# Patient Record
Sex: FEMALE | Race: WHITE | NOT HISPANIC OR LATINO | Employment: OTHER | URBAN - METROPOLITAN AREA
[De-identification: names, ages, dates, MRNs, and addresses within clinical notes are randomized per-mention and may not be internally consistent; named-entity substitution may affect disease eponyms.]

---

## 2019-11-18 ENCOUNTER — APPOINTMENT (OUTPATIENT)
Dept: WOMENS IMAGING | Facility: HOSPITAL | Age: 59
End: 2019-11-18

## 2019-11-18 PROCEDURE — 77067 SCR MAMMO BI INCL CAD: CPT | Performed by: RADIOLOGY

## 2022-04-21 ENCOUNTER — TELEPHONE (OUTPATIENT)
Dept: FAMILY MEDICINE CLINIC | Facility: CLINIC | Age: 62
End: 2022-04-21

## 2022-04-21 NOTE — TELEPHONE ENCOUNTER
SCHEDULED FOR HFU ON 4/27/22 WITH DR. SANTIAGO, SEEN AT [facility] AND [place] ON 4/19 FOR CHEST PAINS

## 2022-04-27 ENCOUNTER — OFFICE VISIT (OUTPATIENT)
Dept: FAMILY MEDICINE CLINIC | Facility: CLINIC | Age: 62
End: 2022-04-27

## 2022-04-27 VITALS — WEIGHT: 251 LBS | SYSTOLIC BLOOD PRESSURE: 138 MMHG | DIASTOLIC BLOOD PRESSURE: 82 MMHG

## 2022-04-27 DIAGNOSIS — R07.9 CHEST PAIN, UNSPECIFIED TYPE: Primary | ICD-10-CM

## 2022-04-27 DIAGNOSIS — R06.02 SHORTNESS OF BREATH: ICD-10-CM

## 2022-04-27 PROCEDURE — 99214 OFFICE O/P EST MOD 30 MIN: CPT | Performed by: STUDENT IN AN ORGANIZED HEALTH CARE EDUCATION/TRAINING PROGRAM

## 2022-04-27 RX ORDER — LOSARTAN POTASSIUM 100 MG/1
TABLET ORAL
COMMUNITY
Start: 2022-04-26 | End: 2022-06-21

## 2022-04-27 RX ORDER — MELOXICAM 15 MG/1
15 TABLET ORAL DAILY
COMMUNITY
Start: 2022-02-27 | End: 2022-11-17

## 2022-04-27 RX ORDER — PROMETHAZINE HYDROCHLORIDE 25 MG/1
TABLET ORAL
COMMUNITY
Start: 2022-03-02

## 2022-04-27 RX ORDER — BUMETANIDE 0.5 MG/1
0.5 TABLET ORAL DAILY PRN
COMMUNITY
Start: 2022-04-18 | End: 2022-05-10 | Stop reason: SDUPTHER

## 2022-04-27 RX ORDER — METHOCARBAMOL 750 MG/1
750 TABLET, FILM COATED ORAL 3 TIMES DAILY PRN
COMMUNITY
Start: 2022-02-01 | End: 2022-11-17

## 2022-04-27 RX ORDER — ALBUTEROL SULFATE 90 UG/1
AEROSOL, METERED RESPIRATORY (INHALATION)
COMMUNITY
Start: 2022-02-24

## 2022-04-27 RX ORDER — MECLIZINE HYDROCHLORIDE 25 MG/1
25 TABLET ORAL 3 TIMES DAILY PRN
COMMUNITY
Start: 2022-03-18 | End: 2023-01-04 | Stop reason: SDUPTHER

## 2022-04-27 NOTE — ASSESSMENT & PLAN NOTE
Echo limited that was ordered by Dr. Lainez, CT unable to be retrieved today to review.  We will continue to follow

## 2022-04-27 NOTE — ASSESSMENT & PLAN NOTE
Has appropriate follow-up scheduled with cardiology.  She is asymptomatic at this time advised that she should continue to take her current losartan and Bumex and call with any new or worsening symptoms.  She should be checking her blood pressure at least 3 times a week to make sure that her blood pressure is remaining stable as she has had some outpatient elevated blood pressures and her blood pressure was very elevated in the emergency department.

## 2022-04-27 NOTE — PROGRESS NOTES
Chief Complaint  Hospital Follow Up Visit (Had Chest pain. Going to See Dr Olea for cardio 5/9)    Broderick Stallings presents to [facility]  History of Present Illness    Patient is here for hospital follow up. She went to the ED on 4/19/22 and She was evaluated in the ED. She state that she has been doing better since she was discharged. She states that she was given Bumex Iv and had improvement in her SOA. She states that she has seen Dr. Lainez since her last appointment here and was told that he does not think that her lungs are causing her underlying SOA, and he as concerned about her heart. She had ECHO and CT scan.     Objective   Vital Signs:   /82 (BP Location: Left arm, Patient Position: Sitting, Cuff Size: Large Adult)   Wt 114 kg (251 lb)     There is no height or weight on file to calculate BMI.    Review of Systems    Past History:  Medical History: has a past medical history of Bell's palsy (2006), Chondromalacia, Environmental allergies, and Tonsillitis.   Surgical History: has a past surgical history that includes Tonsillectomy; Cholecystectomy; and Knee surgery.   Family History: family history is not on file.   Social History: reports that she has never smoked. She has never used smokeless tobacco. Alcohol use questions deferred to the physician. Drug use questions deferred to the physician.      Current Outpatient Medications:   •  bumetanide (BUMEX) 0.5 MG tablet, Take 0.5 mg by mouth Daily As Needed., Disp: , Rfl:   •  losartan (COZAAR) 100 MG tablet, , Disp: , Rfl:   •  meclizine (ANTIVERT) 25 MG tablet, Take 25 mg by mouth 3 (Three) Times a Day As Needed., Disp: , Rfl:   •  meloxicam (MOBIC) 15 MG tablet, Take 15 mg by mouth Daily., Disp: , Rfl:   •  methocarbamol (ROBAXIN) 750 MG tablet, Take 750 mg by mouth 3 (Three) Times a Day As Needed. for muscle spams, Disp: , Rfl:   •  promethazine (PHENERGAN) 25 MG tablet, TAKE 1 TABLET BY  MOUTH EVERY 4 TO 6 HOURS AS NEEDED FOR NAUSEA, Disp: , Rfl:   •  albuterol sulfate  (90 Base) MCG/ACT inhaler, INHALE 2 PUFFS BY MOUTH EVERY 4 TO 6 HOURS AS NEEDED FOR SHORTNESS OF BREATH, Disp: , Rfl:     Allergies: Cephalosporins, Codeine, Eggs or egg-derived products, Hydrocodone, Penicillin g pot [penicillin g], and Ranitidine    Physical Exam  Constitutional:       General: She is not in acute distress.     Appearance: Normal appearance. She is not ill-appearing or toxic-appearing.   HENT:      Head: Normocephalic and atraumatic.   Cardiovascular:      Rate and Rhythm: Normal rate and regular rhythm.      Heart sounds: No murmur heard.    No gallop.   Pulmonary:      Effort: Pulmonary effort is normal.      Breath sounds: Normal breath sounds.   Abdominal:      General: Abdomen is flat.      Palpations: Abdomen is soft.      Tenderness: There is no abdominal tenderness. There is no guarding.   Musculoskeletal:      Right lower leg: Edema (+2 pitting to mid shin) present.      Left lower leg: Edema ( +2 pitting to mid shin) present.   Neurological:      General: No focal deficit present.      Mental Status: She is alert and oriented to person, place, and time.   Psychiatric:         Mood and Affect: Mood normal.         Thought Content: Thought content normal.          Result Review :                   Assessment and Plan    Diagnoses and all orders for this visit:    1. Chest pain, unspecified type (Primary)  Assessment & Plan:  Has appropriate follow-up scheduled with cardiology.  She is asymptomatic at this time advised that she should continue to take her current losartan and Bumex and call with any new or worsening symptoms.  She should be checking her blood pressure at least 3 times a week to make sure that her blood pressure is remaining stable as she has had some outpatient elevated blood pressures and her blood pressure was very elevated in the emergency department.      2. Shortness of  breath  Assessment & Plan:  Echo limited that was ordered by Dr. Lainez, CT unable to be retrieved today to review.  We will continue to follow        Follow Up   Return in about 3 months (around 7/27/2022).  Patient was given instructions and counseling regarding her condition or for health maintenance advice. Please see specific information pulled into the AVS if appropriate.     Virginia Villegas, DO

## 2022-05-11 RX ORDER — BUMETANIDE 0.5 MG/1
0.5 TABLET ORAL DAILY PRN
Qty: 15 TABLET | Refills: 1 | Status: SHIPPED | OUTPATIENT
Start: 2022-05-11 | End: 2022-06-23 | Stop reason: SDUPTHER

## 2022-06-17 ENCOUNTER — TELEPHONE (OUTPATIENT)
Dept: FAMILY MEDICINE CLINIC | Facility: CLINIC | Age: 62
End: 2022-06-17

## 2022-06-17 NOTE — TELEPHONE ENCOUNTER
Patient and sister called.  Two days ago patient fell, bruised her left leg, there are now blisters.  Patient was given earliest available appt with Makenna Jackson, 6/23/22.  They were concerned and didn't know if it she should be sooner. Ph: [phone].

## 2022-06-18 ENCOUNTER — TELEPHONE (OUTPATIENT)
Dept: FAMILY MEDICINE CLINIC | Facility: CLINIC | Age: 62
End: 2022-06-18

## 2022-06-18 ENCOUNTER — OFFICE VISIT (OUTPATIENT)
Dept: FAMILY MEDICINE CLINIC | Facility: CLINIC | Age: 62
End: 2022-06-18

## 2022-06-18 VITALS
HEIGHT: 66 IN | HEART RATE: 65 BPM | WEIGHT: 255 LBS | SYSTOLIC BLOOD PRESSURE: 138 MMHG | OXYGEN SATURATION: 97 % | BODY MASS INDEX: 40.98 KG/M2 | DIASTOLIC BLOOD PRESSURE: 80 MMHG

## 2022-06-18 DIAGNOSIS — M79.605 PAIN OF LEFT LOWER EXTREMITY: Primary | ICD-10-CM

## 2022-06-18 DIAGNOSIS — N28.89 RENAL MASS: ICD-10-CM

## 2022-06-18 PROBLEM — I10 HYPERTENSIVE DISORDER: Status: ACTIVE | Noted: 2022-06-15

## 2022-06-18 PROBLEM — E55.9 VITAMIN D DEFICIENCY: Status: ACTIVE | Noted: 2022-06-15

## 2022-06-18 PROBLEM — E78.5 HYPERLIPIDEMIA: Status: ACTIVE | Noted: 2022-06-15

## 2022-06-18 PROCEDURE — 99214 OFFICE O/P EST MOD 30 MIN: CPT | Performed by: NURSE PRACTITIONER

## 2022-06-18 RX ORDER — NAPROXEN 500 MG/1
TABLET ORAL EVERY 12 HOURS SCHEDULED
COMMUNITY
End: 2022-11-17

## 2022-06-18 NOTE — PROGRESS NOTES
"Chief Complaint  Leg Pain (Left leg)    Subjective          Kandy Stallings presents to [facility]  Pt fell 4 days ago and was dx with left wrist fracture. She has had swelling, rash, redness, and bruising in her left lower since yesterday. She went to the [facility] ER 3 weeks ago for a kidney stone and was dx with a mass on the right kidney. They recommended an MRI for follow up.      Objective   Vital Signs:   /80   Pulse 65   Ht 167.6 cm (66\")   Wt 116 kg (255 lb)   SpO2 97%   BMI 41.16 kg/m²     Body mass index is 41.16 kg/m².    Review of Systems   Constitutional: Negative for fatigue and fever.   Respiratory: Negative for shortness of breath.    Cardiovascular: Negative for chest pain, palpitations and leg swelling.   Musculoskeletal: Positive for arthralgias.   Skin: Positive for rash.   Neurological: Negative for syncope.   Psychiatric/Behavioral: The patient is not nervous/anxious.           Current Outpatient Medications:   •  bumetanide (BUMEX) 0.5 MG tablet, Take 1 tablet by mouth Daily As Needed (swelling)., Disp: 15 tablet, Rfl: 1  •  losartan (COZAAR) 100 MG tablet, , Disp: , Rfl:   •  meclizine (ANTIVERT) 25 MG tablet, Take 25 mg by mouth 3 (Three) Times a Day As Needed., Disp: , Rfl:   •  meloxicam (MOBIC) 15 MG tablet, Take 15 mg by mouth Daily., Disp: , Rfl:   •  methocarbamol (ROBAXIN) 750 MG tablet, Take 750 mg by mouth 3 (Three) Times a Day As Needed. for muscle spams, Disp: , Rfl:   •  naproxen (NAPROSYN) 500 MG tablet, Every 12 (Twelve) Hours., Disp: , Rfl:   •  promethazine (PHENERGAN) 25 MG tablet, TAKE 1 TABLET BY MOUTH EVERY 4 TO 6 HOURS AS NEEDED FOR NAUSEA, Disp: , Rfl:   •  albuterol sulfate  (90 Base) MCG/ACT inhaler, INHALE 2 PUFFS BY MOUTH EVERY 4 TO 6 HOURS AS NEEDED FOR SHORTNESS OF BREATH, Disp: , Rfl:       Allergies: Cephalosporins, Codeine, Hydrocodone, Penicillin g pot [penicillin g], and Ranitidine    Physical " Exam  Constitutional:       Appearance: Normal appearance.   HENT:      Head: Normocephalic.   Eyes:      Conjunctiva/sclera: Conjunctivae normal.      Pupils: Pupils are equal, round, and reactive to light.   Cardiovascular:      Rate and Rhythm: Normal rate and regular rhythm.      Heart sounds: Normal heart sounds.   Pulmonary:      Effort: Pulmonary effort is normal.      Breath sounds: Normal breath sounds.   Abdominal:      Tenderness: There is no abdominal tenderness.   Musculoskeletal:         General: Normal range of motion.      Comments: Ecchymosis, edema, mild erythema, blistering left lower leg.  Abrasion left knee.    Skin:     General: Skin is warm and dry.      Capillary Refill: Capillary refill takes less than 2 seconds.   Neurological:      General: No focal deficit present.      Mental Status: She is alert and oriented to person, place, and time.   Psychiatric:         Mood and Affect: Mood normal.         Behavior: Behavior normal.         Thought Content: Thought content normal.         Judgment: Judgment normal.          Result Review :                   Assessment and Plan    Diagnoses and all orders for this visit:    1. Pain of left lower extremity (Primary)  Comments:  I advised ER evaluation and treatment to rule out DVT and acute fracture. She will follow up after DC.    2. Renal mass  Comments:  I will get records from [facility] and we will order MRI if needed.                  Follow Up   Return in about 1 week (around 6/25/2022) for if not improving or sooner if symptoms worsen.  Patient was given instructions and counseling regarding her condition or for health maintenance advice. Please see specific information pulled into the AVS if appropriate.     SANDHYA Starr

## 2022-06-18 NOTE — TELEPHONE ENCOUNTER
Will you please get records from [facility] approx 3 weeks ago? Thanks!

## 2022-06-21 ENCOUNTER — TELEPHONE (OUTPATIENT)
Dept: FAMILY MEDICINE CLINIC | Facility: CLINIC | Age: 62
End: 2022-06-21

## 2022-06-21 RX ORDER — LOSARTAN POTASSIUM 100 MG/1
TABLET ORAL
Qty: 30 TABLET | Refills: 0 | Status: SHIPPED | OUTPATIENT
Start: 2022-06-21 | End: 2022-08-29

## 2022-06-23 ENCOUNTER — OFFICE VISIT (OUTPATIENT)
Dept: FAMILY MEDICINE CLINIC | Facility: CLINIC | Age: 62
End: 2022-06-23

## 2022-06-23 VITALS
BODY MASS INDEX: 40.5 KG/M2 | SYSTOLIC BLOOD PRESSURE: 140 MMHG | HEART RATE: 55 BPM | WEIGHT: 252 LBS | HEIGHT: 66 IN | DIASTOLIC BLOOD PRESSURE: 76 MMHG | OXYGEN SATURATION: 98 %

## 2022-06-23 DIAGNOSIS — M79.89 SWELLING OF LOWER LEG: ICD-10-CM

## 2022-06-23 DIAGNOSIS — N28.89 MASS OF KIDNEY OF UNKNOWN NATURE: Primary | ICD-10-CM

## 2022-06-23 DIAGNOSIS — S80.822A: ICD-10-CM

## 2022-06-23 PROCEDURE — 99213 OFFICE O/P EST LOW 20 MIN: CPT | Performed by: PHYSICIAN ASSISTANT

## 2022-06-23 RX ORDER — CARVEDILOL 6.25 MG/1
6.25 TABLET ORAL 2 TIMES DAILY WITH MEALS
COMMUNITY
Start: 2022-06-07

## 2022-06-23 RX ORDER — ATORVASTATIN CALCIUM 20 MG/1
20 TABLET, FILM COATED ORAL DAILY
COMMUNITY
Start: 2022-06-03

## 2022-06-23 RX ORDER — BUMETANIDE 0.5 MG/1
0.5 TABLET ORAL DAILY PRN
Qty: 15 TABLET | Refills: 1 | Status: SHIPPED | OUTPATIENT
Start: 2022-06-23

## 2022-06-23 NOTE — PROGRESS NOTES
"Chief Complaint  blisters on L lower leg and Mass on kidney?    Subjective        Kandy Stallings presents to [facility]  History of Present Illness  Patient states that she was seen at this clinic on Saturday for leg pain.  She was sent on to the ER for concern for DVTs.  The frequent ER tested her for DVTs and was found to be negative via Doppler.  She states she has noticed that both legs have been swollen for the past couple of weeks.  She states that her left leg has been more swollen for the past week she has noticed that she has had clear blisters on her lower left lower leg as well.  She states that they are not itchy or painful she has been given hydrocortisone cream to use on the blisters which may have helped a little bit she has had no pain in either leg.  She states that her left leg feels a little numb on the medial side.  She states that she has been given Bumex in the past and used as needed but has been out of the medication for few weeks.    She was seen in the ER in [place] in May at the ER with abdominal pain and was found to have evidence of a 1.5 cm right renal mass or cyst.  MRI follow-up was recommended recommend.    Objective   Vital Signs:  /76   Pulse 55   Ht 167.6 cm (66\")   Wt 114 kg (252 lb)   SpO2 98%   BMI 40.67 kg/m²   Estimated body mass index is 40.67 kg/m² as calculated from the following:    Height as of this encounter: 167.6 cm (66\").    Weight as of this encounter: 114 kg (252 lb).          Physical Exam  Vitals reviewed.   Constitutional:       Appearance: She is obese.   HENT:      Head: Normocephalic.      Right Ear: Tympanic membrane, ear canal and external ear normal.      Left Ear: Tympanic membrane, ear canal and external ear normal.      Nose: Nose normal.      Mouth/Throat:      Mouth: Mucous membranes are moist.   Eyes:      Pupils: Pupils are equal, round, and reactive to light.   Cardiovascular:      Rate and Rhythm: " Normal rate and regular rhythm.      Heart sounds: Normal heart sounds.   Pulmonary:      Effort: Pulmonary effort is normal.      Breath sounds: Normal breath sounds.   Musculoskeletal:        Legs:    Neurological:      General: No focal deficit present.      Mental Status: She is alert.   Psychiatric:         Mood and Affect: Mood normal.        Result Review :                Assessment and Plan   Diagnoses and all orders for this visit:    1. Mass of kidney of unknown nature (Primary)  We will schedule patient for ultrasound and MRI for further evaluation of mass found on kidney.    -     US Renal Bilateral; Future  -     MRI Abdomen Without Contrast; Future    2. Swelling of lower leg  Gave patient information on keeping legs elevated as well as decreasing salt intake and adding compression socks.  We will also have her add Bumex to see if we can decrease some of the fluid on her legs.    -     bumetanide (BUMEX) 0.5 MG tablet; Take 1 tablet by mouth Daily As Needed (swelling).  Dispense: 15 tablet; Refill: 1    3. Blister of lower leg, left, initial encounter    Should improve with medication as above         Follow Up   Return in about 1 week (around 6/30/2022) for Recheck leg swelling and blisters.  Patient was given instructions and counseling regarding her condition or for health maintenance advice. Please see specific information pulled into the AVS if appropriate.

## 2022-06-30 ENCOUNTER — OFFICE VISIT (OUTPATIENT)
Dept: FAMILY MEDICINE CLINIC | Facility: CLINIC | Age: 62
End: 2022-06-30

## 2022-06-30 VITALS
BODY MASS INDEX: 40.18 KG/M2 | SYSTOLIC BLOOD PRESSURE: 130 MMHG | HEART RATE: 60 BPM | DIASTOLIC BLOOD PRESSURE: 82 MMHG | OXYGEN SATURATION: 97 % | WEIGHT: 250 LBS | HEIGHT: 66 IN

## 2022-06-30 DIAGNOSIS — M79.89 SWELLING OF LOWER LEG: Primary | ICD-10-CM

## 2022-06-30 DIAGNOSIS — S80.822A: ICD-10-CM

## 2022-06-30 PROCEDURE — 99213 OFFICE O/P EST LOW 20 MIN: CPT | Performed by: PHYSICIAN ASSISTANT

## 2022-06-30 RX ORDER — TRIAMCINOLONE ACETONIDE 0.25 MG/G
OINTMENT TOPICAL
COMMUNITY
Start: 2022-06-18

## 2022-06-30 NOTE — PROGRESS NOTES
"Chief Complaint  Follow-up (Leg swelling. improving)    Subjective        Kandy Stallings presents to [facility]  History of Present Illness  Patient states that her legs have been looking better.  She states that the blisters are gone from her left leg as well.  That she is been using compression hose with help.  She states that she started the Bumex and that seems to be helping as well.  She states that she is now able to get her shoes on.  She denies any lightheadedness or other new symptoms since being on Bumex.  Her blood pressure today is improved.    Objective   Vital Signs:  /82   Pulse 60   Ht 167.6 cm (66\")   Wt 113 kg (250 lb)   SpO2 97%   BMI 40.35 kg/m²   Estimated body mass index is 40.35 kg/m² as calculated from the following:    Height as of this encounter: 167.6 cm (66\").    Weight as of this encounter: 113 kg (250 lb).          Physical Exam  Vitals reviewed.   Constitutional:       Appearance: Normal appearance. She is obese.   HENT:      Head: Normocephalic.      Right Ear: Tympanic membrane, ear canal and external ear normal.      Left Ear: Tympanic membrane, ear canal and external ear normal.      Nose: Nose normal.      Mouth/Throat:      Mouth: Mucous membranes are moist.   Eyes:      Pupils: Pupils are equal, round, and reactive to light.   Cardiovascular:      Rate and Rhythm: Normal rate and regular rhythm.      Heart sounds: Normal heart sounds.   Pulmonary:      Effort: Pulmonary effort is normal.      Breath sounds: Normal breath sounds.   Musculoskeletal:      Comments: Lower legs still slightly swollen but improved from last visit and no further blisters on skin.   Skin:     General: Skin is warm.   Neurological:      General: No focal deficit present.      Mental Status: She is alert.   Psychiatric:         Mood and Affect: Mood normal.        Result Review :                Assessment and Plan   Diagnoses and all orders for this visit:    1. " Swelling of lower leg (Primary)  Improved we will have patient continue to elevate her legs as well as using compression socks and avoiding salt.  We will have her continue her Bumex.  2. Blister of lower leg, left, initial encounter    Improved with improving fluid levels in her legs.  Will monitor.         Follow Up   No follow-ups on file.  Patient was given instructions and counseling regarding her condition or for health maintenance advice. Please see specific information pulled into the AVS if appropriate.

## 2022-07-14 ENCOUNTER — OFFICE VISIT (OUTPATIENT)
Dept: FAMILY MEDICINE CLINIC | Facility: CLINIC | Age: 62
End: 2022-07-14

## 2022-07-14 VITALS
SYSTOLIC BLOOD PRESSURE: 136 MMHG | OXYGEN SATURATION: 97 % | HEART RATE: 60 BPM | HEIGHT: 66 IN | BODY MASS INDEX: 40.26 KG/M2 | WEIGHT: 250.5 LBS | DIASTOLIC BLOOD PRESSURE: 84 MMHG

## 2022-07-14 DIAGNOSIS — M54.9 OTHER ACUTE BACK PAIN: Primary | ICD-10-CM

## 2022-07-14 PROCEDURE — 99213 OFFICE O/P EST LOW 20 MIN: CPT | Performed by: PHYSICIAN ASSISTANT

## 2022-07-14 RX ORDER — CYCLOBENZAPRINE HCL 10 MG
10 TABLET ORAL EVERY 8 HOURS
COMMUNITY
Start: 2022-07-10 | End: 2022-11-17

## 2022-07-14 NOTE — PROGRESS NOTES
"Chief Complaint  Hospital Follow Up Visit (Back pain, pain started after having an MRI on 7/5/2022, karley to ER 7/9/2022, get two shots wit some help, not any better.//Had a scan of the Thyroid to check for cyst 7/13/2022. Waiting for results)    Broderick Stallings presents to [facility]  History of Present Illness  Patient states that she recently had an MRI of her back and had pain all the way down her spine after lying on the machine for about half an hour.  She was seen in the ER on the ninth of this month.  While there she was given 2 shots of Valium.  She states that the shots seem to help.  She was also given Flexeril and has been taking it 3 times a day without any help.  She has been off her meloxicam and naproxen.    Objective   Vital Signs:  /84   Pulse 60   Ht 167.6 cm (66\")   Wt 114 kg (250 lb 8 oz)   SpO2 97%   BMI 40.43 kg/m²   Estimated body mass index is 40.43 kg/m² as calculated from the following:    Height as of this encounter: 167.6 cm (66\").    Weight as of this encounter: 114 kg (250 lb 8 oz).          Physical Exam  Vitals and nursing note reviewed.   Constitutional:       Appearance: Normal appearance.   HENT:      Head: Normocephalic and atraumatic.      Right Ear: Tympanic membrane, ear canal and external ear normal.      Left Ear: Tympanic membrane, ear canal and external ear normal.      Nose: Nose normal.      Mouth/Throat:      Mouth: Mucous membranes are moist.   Eyes:      Pupils: Pupils are equal, round, and reactive to light.   Cardiovascular:      Rate and Rhythm: Normal rate and regular rhythm.      Heart sounds: Normal heart sounds.   Pulmonary:      Effort: Pulmonary effort is normal.      Breath sounds: Normal breath sounds.   Neurological:      General: No focal deficit present.      Mental Status: She is alert.   Psychiatric:         Mood and Affect: Mood normal.        Result Review :                Assessment and Plan " {CC Problem List  Visit Diagnosis   ROS  Review (Popup)  Health Maintenance  Quality  BestPractice  Medications  SmartSets  SnapShot Encounters  Media :23}  Diagnoses and all orders for this visit:    1. Other acute back pain (Primary)    Patient with back pain after lying on flat surface.  We will have her restart her meloxicam or her Aleve along with continuing her Flexeril.  If her symptoms do not improve over the next week she is to call our office.  If her symptoms worsen she is to call our office.         Follow Up   Return in about 2 weeks (around 7/28/2022) for Recheck back pain.  Patient was given instructions and counseling regarding her condition or for health maintenance advice. Please see specific information pulled into the AVS if appropriate.

## 2022-07-31 NOTE — PROGRESS NOTES
I have reviewed the notes, assessments, and/or procedures performed by OVIDIO Matson.  I concur with her/his documentation of Kandy Stallings.

## 2022-08-29 RX ORDER — LOSARTAN POTASSIUM 100 MG/1
TABLET ORAL
Qty: 30 TABLET | Refills: 0 | Status: SHIPPED | OUTPATIENT
Start: 2022-08-29 | End: 2022-09-26

## 2022-08-29 NOTE — TELEPHONE ENCOUNTER
"HUB TO READ  \"we are getting a request for medication refill. I sent in 30 days. You are due for an appointment for your medications, last visits were acute visits. Please get an appointment scheduled\"      Called and LVM to return call  "

## 2022-09-26 RX ORDER — LOSARTAN POTASSIUM 100 MG/1
TABLET ORAL
Qty: 30 TABLET | Refills: 0 | Status: SHIPPED | OUTPATIENT
Start: 2022-09-26 | End: 2022-11-17 | Stop reason: SDUPTHER

## 2022-09-26 NOTE — TELEPHONE ENCOUNTER
Left message needs [facility] apt and labs. Sent [facility]  Hub to read: pt needs appt.

## 2022-11-17 ENCOUNTER — OFFICE VISIT (OUTPATIENT)
Dept: FAMILY MEDICINE CLINIC | Facility: CLINIC | Age: 62
End: 2022-11-17

## 2022-11-17 VITALS
HEART RATE: 71 BPM | DIASTOLIC BLOOD PRESSURE: 84 MMHG | BODY MASS INDEX: 40.66 KG/M2 | WEIGHT: 253 LBS | OXYGEN SATURATION: 100 % | HEIGHT: 66 IN | SYSTOLIC BLOOD PRESSURE: 140 MMHG

## 2022-11-17 DIAGNOSIS — K21.00 GASTROESOPHAGEAL REFLUX DISEASE WITH ESOPHAGITIS WITHOUT HEMORRHAGE: ICD-10-CM

## 2022-11-17 DIAGNOSIS — Z79.899 HIGH RISK MEDICATION USE: ICD-10-CM

## 2022-11-17 DIAGNOSIS — R06.02 SHORTNESS OF BREATH: ICD-10-CM

## 2022-11-17 DIAGNOSIS — R53.82 CHRONIC FATIGUE: ICD-10-CM

## 2022-11-17 DIAGNOSIS — I10 PRIMARY HYPERTENSION: Primary | ICD-10-CM

## 2022-11-17 DIAGNOSIS — D53.1 MEGALOBLASTIC ANEMIA: ICD-10-CM

## 2022-11-17 DIAGNOSIS — E87.6 HYPOKALEMIA: ICD-10-CM

## 2022-11-17 DIAGNOSIS — E66.01 MORBID (SEVERE) OBESITY DUE TO EXCESS CALORIES: ICD-10-CM

## 2022-11-17 DIAGNOSIS — E55.9 VITAMIN D DEFICIENCY: ICD-10-CM

## 2022-11-17 DIAGNOSIS — E78.2 MIXED HYPERLIPIDEMIA: ICD-10-CM

## 2022-11-17 PROCEDURE — 99214 OFFICE O/P EST MOD 30 MIN: CPT | Performed by: FAMILY MEDICINE

## 2022-11-17 RX ORDER — ERGOCALCIFEROL 1.25 MG/1
50000 CAPSULE ORAL
COMMUNITY
End: 2022-12-19 | Stop reason: SDUPTHER

## 2022-11-17 RX ORDER — OMEPRAZOLE 40 MG/1
40 CAPSULE, DELAYED RELEASE ORAL DAILY
Qty: 90 CAPSULE | Refills: 3 | Status: SHIPPED | OUTPATIENT
Start: 2022-11-17

## 2022-11-17 RX ORDER — LOSARTAN POTASSIUM 100 MG/1
100 TABLET ORAL DAILY
Qty: 90 TABLET | Refills: 3 | Status: SHIPPED | OUTPATIENT
Start: 2022-11-17

## 2022-11-17 RX ORDER — OMEPRAZOLE 40 MG/1
40 CAPSULE, DELAYED RELEASE ORAL 2 TIMES DAILY
COMMUNITY
End: 2022-11-17 | Stop reason: SDUPTHER

## 2022-11-17 NOTE — PROGRESS NOTES
Office Note     Name: Kandy Stallings    : 1960     MRN: [id number]     Chief Complaint  Hypertension, Follow-up (Follow up from Cardiologist and Pulmonologist.), Fatigue, Thyroid Nodule, and Colonoscopy    Subjective     History of Present Illness:  Kandy Stallings is a 62 y.o. female who presents today for follow-up of many things.  First of all, she had a cough shortness of breath was sent by Dr. Donaldson to see Dr. Lainez and and he did not say she have COPD or bronchitis.  He had a night where the BiPAP machine and since she has not been without it sleeping later and later.  He did a CT scan and an MRI    She had a visit with Dr. Farah when he did a chemically induced nonstress test and she passed with flying colors.  He added a atorvastatin to her regimen and a Bumex if needed.    She had of fall playing putt putt x-rays are negative MRI was negative and she could not get up from the MRI.  That is all better now.  Review of Systems:   Review of Systems    Past Medical History:   Past Medical History:   Diagnosis Date   • Bell's palsy    • Chondromalacia     arthoscopy   • Environmental allergies     completed allergy shots   • Tonsillitis        Past Surgical History:   Past Surgical History:   Procedure Laterality Date   • CHOLECYSTECTOMY     • KNEE SURGERY     • TONSILLECTOMY         Family History: History reviewed. No pertinent family history.    Social History:   Social History     Socioeconomic History   • Marital status: Single   Tobacco Use   • Smoking status: Never   • Smokeless tobacco: Never   Vaping Use   • Vaping Use: Never used   Substance and Sexual Activity   • Alcohol use: Defer   • Drug use: Defer   • Sexual activity: Defer       Immunizations:   Immunization History   Administered Date(s) Administered   • COVID-19 (MODERNA) 1st, 2nd, 3rd Dose Only 2021, 2021, 10/28/2021, 2022   • COVID-19 (MODERNA) BIVALENT BOOSTER 18+YRS 2022   • Flublok 18+yrs  "09/21/2019, 09/08/2020, 10/28/2021   • Pneumococcal Conjugate 13-Valent (PCV13) 09/24/2018   • Pneumococcal Polysaccharide (PPSV23) 06/14/2016   • Tdap 07/29/2018        Medications:     Current Outpatient Medications:   •  atorvastatin (LIPITOR) 20 MG tablet, Take 20 mg by mouth Daily., Disp: , Rfl:   •  bumetanide (BUMEX) 0.5 MG tablet, Take 1 tablet by mouth Daily As Needed (swelling)., Disp: 15 tablet, Rfl: 1  •  carvedilol (COREG) 6.25 MG tablet, Take 6.25 mg by mouth 2 (Two) Times a Day With Meals., Disp: , Rfl:   •  losartan (COZAAR) 100 MG tablet, Take 1 tablet by mouth Daily., Disp: 90 tablet, Rfl: 3  •  meclizine (ANTIVERT) 25 MG tablet, Take 25 mg by mouth 3 (Three) Times a Day As Needed., Disp: , Rfl:   •  omeprazole (priLOSEC) 40 MG capsule, Take 1 capsule by mouth Daily., Disp: 90 capsule, Rfl: 3  •  promethazine (PHENERGAN) 25 MG tablet, TAKE 1 TABLET BY MOUTH EVERY 4 TO 6 HOURS AS NEEDED FOR NAUSEA, Disp: , Rfl:   •  vitamin D (ERGOCALCIFEROL) 1.25 MG (13315 UT) capsule capsule, Take 50,000 Units by mouth. Take 1 capsule twice a week, Disp: , Rfl:   •  albuterol sulfate  (90 Base) MCG/ACT inhaler, INHALE 2 PUFFS BY MOUTH EVERY 4 TO 6 HOURS AS NEEDED FOR SHORTNESS OF BREATH, Disp: , Rfl:   •  triamcinolone (KENALOG) 0.025 % ointment, , Disp: , Rfl:     Allergies:   Allergies   Allergen Reactions   • Cephalosporins Provider Review Needed   • Codeine Provider Review Needed   • Hydrocodone Provider Review Needed   • Penicillin G Pot [Penicillin G] Provider Review Needed   • Ranitidine Provider Review Needed       Objective     Vital Signs  /84   Pulse 71   Ht 167.6 cm (66\")   Wt 115 kg (253 lb)   SpO2 100%   BMI 40.84 kg/m²   Estimated body mass index is 40.84 kg/m² as calculated from the following:    Height as of this encounter: 167.6 cm (66\").    Weight as of this encounter: 115 kg (253 lb).          Physical Exam  Vitals and nursing note reviewed.   Constitutional:       Appearance: " She is obese.   HENT:      Right Ear: External ear normal.      Left Ear: External ear normal.      Nose: Nose normal.   Eyes:      Pupils: Pupils are equal, round, and reactive to light.   Cardiovascular:      Rate and Rhythm: Regular rhythm.   Pulmonary:      Effort: Pulmonary effort is normal.      Breath sounds: Normal breath sounds.   Musculoskeletal:      Cervical back: Normal range of motion.   Neurological:      Mental Status: She is alert.          Procedures     Assessment and Plan     1. Primary hypertension  Asked the who refill her losartan of which she has not been on x2 months.  - losartan (COZAAR) 100 MG tablet; Take 1 tablet by mouth Daily.  Dispense: 90 tablet; Refill: 3  - Comprehensive Metabolic Panel; Future  - Comprehensive Metabolic Panel    2. Mixed hyperlipidemia  Recheck liver test, lipid today  - Hemoglobin A1c; Future  - Lipid Panel; Future  - Hemoglobin A1c  - Lipid Panel    3. Vitamin D deficiency  Will have her check today  - Vitamin D,25-Hydroxy; Future  - Vitamin D,25-Hydroxy    4. Morbid (severe) obesity due to excess calories (HCC)  I really want to know what A1c which show in her  - Hemoglobin A1c; Future  - Hemoglobin A1c    5. High risk medication use    6. Megaloblastic anemia  Check levels  - Vitamin B12 & Folate; Future  - Vitamin B12 & Folate    7. Hypokalemia  Magnesium, and potassium, and calcium  - Magnesium; Future  - Magnesium    8. Gastroesophageal reflux disease with esophagitis without hemorrhage  We will go down from 40 twice daily to 40 daily in hopes that GERD would be recurring  - omeprazole (priLOSEC) 40 MG capsule; Take 1 capsule by mouth Daily.  Dispense: 90 capsule; Refill: 3    9. Chronic fatigue  Checked on today  - CBC & Differential; Future  - TSH Rfx On Abnormal To Free T4; Future  - CBC & Differential  - TSH Rfx On Abnormal To Free T4    10. Shortness of breath  Check on today when she is sees Lizbeth Barbour will wear her CPAP.       Follow Up  Return if  symptoms worsen or fail to improve.    [facility]  [address] [address] [phone]

## 2022-11-18 LAB
25(OH)D3+25(OH)D2 SERPL-MCNC: 18.4 NG/ML (ref 30–100)
ALBUMIN SERPL-MCNC: 4.2 G/DL (ref 3.8–4.8)
ALBUMIN/GLOB SERPL: 1.7 {RATIO} (ref 1.2–2.2)
ALP SERPL-CCNC: 94 IU/L (ref 44–121)
ALT SERPL-CCNC: 35 IU/L (ref 0–32)
AST SERPL-CCNC: 28 IU/L (ref 0–40)
BASOPHILS # BLD AUTO: 0.1 X10E3/UL (ref 0–0.2)
BASOPHILS NFR BLD AUTO: 1 %
BILIRUB SERPL-MCNC: 0.5 MG/DL (ref 0–1.2)
BUN SERPL-MCNC: 19 MG/DL (ref 8–27)
BUN/CREAT SERPL: 20 (ref 12–28)
CALCIUM SERPL-MCNC: 9.2 MG/DL (ref 8.7–10.3)
CHLORIDE SERPL-SCNC: 101 MMOL/L (ref 96–106)
CHOLEST SERPL-MCNC: 227 MG/DL (ref 100–199)
CO2 SERPL-SCNC: 24 MMOL/L (ref 20–29)
CREAT SERPL-MCNC: 0.95 MG/DL (ref 0.57–1)
EGFRCR SERPLBLD CKD-EPI 2021: 67 ML/MIN/1.73
EOSINOPHIL # BLD AUTO: 0.3 X10E3/UL (ref 0–0.4)
EOSINOPHIL NFR BLD AUTO: 3 %
ERYTHROCYTE [DISTWIDTH] IN BLOOD BY AUTOMATED COUNT: 12.9 % (ref 11.7–15.4)
FOLATE SERPL-MCNC: 5.6 NG/ML
GLOBULIN SER CALC-MCNC: 2.5 G/DL (ref 1.5–4.5)
GLUCOSE SERPL-MCNC: 111 MG/DL (ref 70–99)
HBA1C MFR BLD: 6.4 % (ref 4.8–5.6)
HCT VFR BLD AUTO: 46.1 % (ref 34–46.6)
HDLC SERPL-MCNC: 78 MG/DL
HGB BLD-MCNC: 15.3 G/DL (ref 11.1–15.9)
IMM GRANULOCYTES # BLD AUTO: 0.1 X10E3/UL (ref 0–0.1)
IMM GRANULOCYTES NFR BLD AUTO: 1 %
LDLC SERPL CALC-MCNC: 134 MG/DL (ref 0–99)
LYMPHOCYTES # BLD AUTO: 1.8 X10E3/UL (ref 0.7–3.1)
LYMPHOCYTES NFR BLD AUTO: 20 %
MAGNESIUM SERPL-MCNC: 2.1 MG/DL (ref 1.6–2.3)
MCH RBC QN AUTO: 31.3 PG (ref 26.6–33)
MCHC RBC AUTO-ENTMCNC: 33.2 G/DL (ref 31.5–35.7)
MCV RBC AUTO: 94 FL (ref 79–97)
MONOCYTES # BLD AUTO: 0.6 X10E3/UL (ref 0.1–0.9)
MONOCYTES NFR BLD AUTO: 7 %
NEUTROPHILS # BLD AUTO: 6 X10E3/UL (ref 1.4–7)
NEUTROPHILS NFR BLD AUTO: 68 %
PLATELET # BLD AUTO: 266 X10E3/UL (ref 150–450)
POTASSIUM SERPL-SCNC: 3.6 MMOL/L (ref 3.5–5.2)
PROT SERPL-MCNC: 6.7 G/DL (ref 6–8.5)
RBC # BLD AUTO: 4.89 X10E6/UL (ref 3.77–5.28)
SODIUM SERPL-SCNC: 143 MMOL/L (ref 134–144)
TRIGL SERPL-MCNC: 89 MG/DL (ref 0–149)
TSH SERPL DL<=0.005 MIU/L-ACNC: 2.62 UIU/ML (ref 0.45–4.5)
VIT B12 SERPL-MCNC: 345 PG/ML (ref 232–1245)
VLDLC SERPL CALC-MCNC: 15 MG/DL (ref 5–40)
WBC # BLD AUTO: 8.8 X10E3/UL (ref 3.4–10.8)

## 2022-11-19 ENCOUNTER — TELEPHONE (OUTPATIENT)
Dept: FAMILY MEDICINE CLINIC | Facility: CLINIC | Age: 62
End: 2022-11-19

## 2022-11-19 NOTE — TELEPHONE ENCOUNTER
Pt was returning call to the office, was read lab results and was wondering if she needed to be seen again or if  was going to start her on metformin for her elevated sugars.

## 2022-11-21 NOTE — TELEPHONE ENCOUNTER
Advised patient Dr. Arroyo is out of the office this week. She is okay with starting the metformin and is okay to wait.

## 2022-11-28 RX ORDER — METFORMIN HYDROCHLORIDE 500 MG/1
500 TABLET, EXTENDED RELEASE ORAL
Qty: 60 TABLET | Refills: 2 | Status: SHIPPED | OUTPATIENT
Start: 2022-11-28 | End: 2023-01-04

## 2022-12-19 ENCOUNTER — OFFICE VISIT (OUTPATIENT)
Dept: FAMILY MEDICINE CLINIC | Facility: CLINIC | Age: 62
End: 2022-12-19

## 2022-12-19 VITALS
HEART RATE: 56 BPM | BODY MASS INDEX: 40.34 KG/M2 | WEIGHT: 251 LBS | HEIGHT: 66 IN | SYSTOLIC BLOOD PRESSURE: 116 MMHG | DIASTOLIC BLOOD PRESSURE: 72 MMHG | OXYGEN SATURATION: 96 %

## 2022-12-19 DIAGNOSIS — R73.03 PREDIABETES: ICD-10-CM

## 2022-12-19 DIAGNOSIS — M54.50 ACUTE BILATERAL LOW BACK PAIN WITHOUT SCIATICA: ICD-10-CM

## 2022-12-19 DIAGNOSIS — I10 PRIMARY HYPERTENSION: ICD-10-CM

## 2022-12-19 DIAGNOSIS — E55.9 VITAMIN D DEFICIENCY: ICD-10-CM

## 2022-12-19 DIAGNOSIS — E78.2 MIXED HYPERLIPIDEMIA: ICD-10-CM

## 2022-12-19 DIAGNOSIS — M25.561 PATELLAR TENDERNESS, RIGHT: Primary | ICD-10-CM

## 2022-12-19 PROCEDURE — 99214 OFFICE O/P EST MOD 30 MIN: CPT | Performed by: FAMILY MEDICINE

## 2022-12-19 RX ORDER — ERGOCALCIFEROL 1.25 MG/1
50000 CAPSULE ORAL 2 TIMES WEEKLY
Qty: 26 CAPSULE | Refills: 1 | Status: SHIPPED | OUTPATIENT
Start: 2022-12-19

## 2022-12-19 RX ORDER — CELECOXIB 200 MG/1
200 CAPSULE ORAL DAILY
Qty: 30 CAPSULE | Refills: 0 | Status: SHIPPED | OUTPATIENT
Start: 2022-12-19

## 2022-12-19 NOTE — PROGRESS NOTES
"    Office Note     Name: Kandy Stallings    : 1960     MRN: [id number]     Chief Complaint  Knee Pain (Right knee pain) and Back Pain    Subjective     History of Present Illness:  Kandy Stallings is a 62 y.o. female who presents today for for ongoing right knee pain times a month or 2.  It is right in the center comes and goes, is a care as she is starts to get up.  The back has locked up on her this weekend.  He could move it 45 minutes, central, does not radiate down both legs.  When it pops \"it goes out on her\"    Did talk a little about the A1c jumping up from 6.2000 21-6.4 last month.  ALT 35 compared to 0-32    Her B12 dropped to 18.3 taking none of the medicine.  I want her to take 50,000 units once a week.  She will check all levels A1c, vitamin D, B12 and folic acid, CHEM 24    Review of Systems:   Review of Systems    Past Medical History:   Past Medical History:   Diagnosis Date   • Bell's palsy    • Chondromalacia     arthoscopy   • Environmental allergies     completed allergy shots   • Tonsillitis        Past Surgical History:   Past Surgical History:   Procedure Laterality Date   • CHOLECYSTECTOMY     • KNEE SURGERY     • TONSILLECTOMY         Family History: History reviewed. No pertinent family history.    Social History:   Social History     Socioeconomic History   • Marital status: Single   Tobacco Use   • Smoking status: Never   • Smokeless tobacco: Never   Vaping Use   • Vaping Use: Never used   Substance and Sexual Activity   • Alcohol use: Defer   • Drug use: Defer   • Sexual activity: Defer       Immunizations:   Immunization History   Administered Date(s) Administered   • COVID-19 (MODERNA) 1st, 2nd, 3rd Dose Only 2021, 2021, 10/28/2021, 2022   • COVID-19 (MODERNA) BIVALENT BOOSTER 12+YRS 2022   • Flublok 18+yrs 2019, 2020, 10/28/2021   • Pneumococcal Conjugate 13-Valent (PCV13) 2018   • Pneumococcal Polysaccharide (PPSV23) " "06/14/2016   • Tdap 07/29/2018        Medications:     Current Outpatient Medications:   •  albuterol sulfate  (90 Base) MCG/ACT inhaler, INHALE 2 PUFFS BY MOUTH EVERY 4 TO 6 HOURS AS NEEDED FOR SHORTNESS OF BREATH, Disp: , Rfl:   •  atorvastatin (LIPITOR) 20 MG tablet, Take 20 mg by mouth Daily., Disp: , Rfl:   •  bumetanide (BUMEX) 0.5 MG tablet, Take 1 tablet by mouth Daily As Needed (swelling)., Disp: 15 tablet, Rfl: 1  •  carvedilol (COREG) 6.25 MG tablet, Take 6.25 mg by mouth 2 (Two) Times a Day With Meals., Disp: , Rfl:   •  losartan (COZAAR) 100 MG tablet, Take 1 tablet by mouth Daily., Disp: 90 tablet, Rfl: 3  •  meclizine (ANTIVERT) 25 MG tablet, Take 25 mg by mouth 3 (Three) Times a Day As Needed., Disp: , Rfl:   •  metFORMIN ER (Glucophage XR) 500 MG 24 hr tablet, Take 1 tablet by mouth 2 (Two) Times a Day Before Meals., Disp: 60 tablet, Rfl: 2  •  omeprazole (priLOSEC) 40 MG capsule, Take 1 capsule by mouth Daily., Disp: 90 capsule, Rfl: 3  •  promethazine (PHENERGAN) 25 MG tablet, TAKE 1 TABLET BY MOUTH EVERY 4 TO 6 HOURS AS NEEDED FOR NAUSEA, Disp: , Rfl:   •  triamcinolone (KENALOG) 0.025 % ointment, , Disp: , Rfl:   •  vitamin D (ERGOCALCIFEROL) 1.25 MG (26314 UT) capsule capsule, Take 1 capsule by mouth 2 (Two) Times a Week. Take 1 capsule twice a week, Disp: 26 capsule, Rfl: 1  •  celecoxib (CeleBREX) 200 MG capsule, Take 1 capsule by mouth Daily., Disp: 30 capsule, Rfl: 0    Allergies:   Allergies   Allergen Reactions   • Cephalosporins Provider Review Needed   • Codeine Provider Review Needed   • Hydrocodone Provider Review Needed   • Penicillin G Pot [Penicillin G] Provider Review Needed   • Ranitidine Provider Review Needed       Objective     Vital Signs  /72   Pulse 56   Ht 167.6 cm (66\")   Wt 114 kg (251 lb)   SpO2 96%   BMI 40.51 kg/m²   Estimated body mass index is 40.51 kg/m² as calculated from the following:    Height as of this encounter: 167.6 cm (66\").    Weight as " of this encounter: 114 kg (251 lb).          Physical Exam  Vitals and nursing note reviewed.   Constitutional:       Appearance: Normal appearance. She is normal weight.   HENT:      Head: Normocephalic.      Right Ear: External ear normal.      Left Ear: External ear normal.      Nose: Nose normal.      Mouth/Throat:      Mouth: Mucous membranes are moist.   Cardiovascular:      Rate and Rhythm: Regular rhythm.   Musculoskeletal:      Cervical back: Normal range of motion and neck supple.      Lumbar back: Tenderness present. Decreased range of motion.        Back:         Legs:       Comments: That is where she's sore, patellar tendon.   Skin:     General: Skin is warm and dry.   Neurological:      Mental Status: She is alert.   Psychiatric:         Mood and Affect: Mood normal.          Procedures     Assessment and Plan     1. Patellar tenderness, right  As you know she had horrible GERD, will use Celebrex 200 mg/day, take 7 to 10 days and get off of it    2. Acute bilateral low back pain without sciatica  Celebrex will help he osteoarthritis in her lumbar spine    3. Primary hypertension  Blood pressure 116/72 is excellent    4. Mixed hyperlipidemia  Do not want to introduce, taking the statin would be borderline    5. Prediabetes  Enhanced to know what the A1c is coming down to 2 metformin she has not been take    6. Vitamin D deficiency  Take twice a week and levels vitamin D       Follow Up  Return in about 3 months (around 3/19/2023).    [facility]  [address] [place] [address] [phone]

## 2023-01-04 ENCOUNTER — OFFICE VISIT (OUTPATIENT)
Dept: FAMILY MEDICINE CLINIC | Facility: CLINIC | Age: 63
End: 2023-01-04
Payer: COMMERCIAL

## 2023-01-04 ENCOUNTER — TELEPHONE (OUTPATIENT)
Dept: FAMILY MEDICINE CLINIC | Facility: CLINIC | Age: 63
End: 2023-01-04

## 2023-01-04 VITALS
HEIGHT: 66 IN | BODY MASS INDEX: 39.86 KG/M2 | HEART RATE: 50 BPM | WEIGHT: 248 LBS | SYSTOLIC BLOOD PRESSURE: 156 MMHG | OXYGEN SATURATION: 98 % | DIASTOLIC BLOOD PRESSURE: 80 MMHG

## 2023-01-04 DIAGNOSIS — R42 DIZZINESS: Primary | ICD-10-CM

## 2023-01-04 DIAGNOSIS — R73.03 PREDIABETES: ICD-10-CM

## 2023-01-04 PROCEDURE — 99214 OFFICE O/P EST MOD 30 MIN: CPT | Performed by: FAMILY MEDICINE

## 2023-01-04 RX ORDER — MECLIZINE HYDROCHLORIDE 25 MG/1
25 TABLET ORAL 3 TIMES DAILY PRN
Qty: 60 TABLET | Refills: 1 | Status: SHIPPED | OUTPATIENT
Start: 2023-01-04

## 2023-01-04 NOTE — TELEPHONE ENCOUNTER
Hub staff attempted to follow warm transfer process and was unsuccessful     Relationship to patient: Self    Best call back number: [phone]    Chief complaint: DIZZY;     Type of visit: SAME DAY    Requested date: TODAY     If rescheduling, when is the original appointment: N/A    Additional notes:PATIENT STATED THAT YESTERDAY MORNING SHE WOKE UP WITH SEVERE VERTIGO AND WENT BLACK AND HAD TO SIT DOWN BUT FELT LIKE SHE WAS GOING TO PASS OUT AND WOULD LIKE TO SEE ABOUT GETTING INTO SEE ANYONE AVAILABLE TODAY; WARM TRANSFER UNSUCCESSFUL     PLEASE ADVISE

## 2023-04-19 ENCOUNTER — OFFICE VISIT (OUTPATIENT)
Dept: FAMILY MEDICINE CLINIC | Facility: CLINIC | Age: 63
End: 2023-04-19
Payer: COMMERCIAL

## 2023-04-19 VITALS
HEIGHT: 66 IN | BODY MASS INDEX: 40.02 KG/M2 | HEART RATE: 69 BPM | RESPIRATION RATE: 18 BRPM | OXYGEN SATURATION: 96 % | WEIGHT: 249 LBS | DIASTOLIC BLOOD PRESSURE: 68 MMHG | SYSTOLIC BLOOD PRESSURE: 118 MMHG

## 2023-04-19 DIAGNOSIS — K58.0 IRRITABLE BOWEL SYNDROME WITH DIARRHEA: ICD-10-CM

## 2023-04-19 DIAGNOSIS — E55.9 VITAMIN D DEFICIENCY: ICD-10-CM

## 2023-04-19 DIAGNOSIS — M79.89 SWELLING OF LOWER LEG: ICD-10-CM

## 2023-04-19 DIAGNOSIS — K21.00 GASTROESOPHAGEAL REFLUX DISEASE WITH ESOPHAGITIS WITHOUT HEMORRHAGE: ICD-10-CM

## 2023-04-19 DIAGNOSIS — E78.2 MIXED HYPERLIPIDEMIA: ICD-10-CM

## 2023-04-19 DIAGNOSIS — I10 PRIMARY HYPERTENSION: Primary | ICD-10-CM

## 2023-04-19 DIAGNOSIS — R73.03 PREDIABETES: ICD-10-CM

## 2023-04-19 PROCEDURE — 99214 OFFICE O/P EST MOD 30 MIN: CPT | Performed by: FAMILY MEDICINE

## 2023-04-19 NOTE — PROGRESS NOTES
Office Note     Name: Kandy Stallings    : 1960     MRN: [id number]     Chief Complaint  Med Refill, Hypertension, and Follow-up    Subjective     History of Present Illness:  Kandy Stallings is a 63 y.o. female who presents today for hypertension got a confession to make she has not been on the Coreg 0.25 twice daily for 2 months.  Pressures remained stable no chest pain, no shortness of breath, no MORRIS, no presyncopal feelings.  She has a history of irritable bowel syndrome for 2 years now, she eats a little, go to the bathroom 3 more times.  Water makes sure it is not C. difficile.    She stopped the metformin and take taking her 25 mg of Jardiance.  Come to think of it she has an had UTIs nor yeast infections    Review of Systems:   Review of Systems    Past Medical History:   Past Medical History:   Diagnosis Date   • Bell's palsy    • Chondromalacia     arthoscopy   • Environmental allergies     completed allergy shots   • Tonsillitis        Past Surgical History:   Past Surgical History:   Procedure Laterality Date   • CHOLECYSTECTOMY     • KNEE SURGERY     • TONSILLECTOMY         Family History: History reviewed. No pertinent family history.    Social History:   Social History     Socioeconomic History   • Marital status: Single   Tobacco Use   • Smoking status: Never   • Smokeless tobacco: Never   Vaping Use   • Vaping Use: Never used   Substance and Sexual Activity   • Alcohol use: Defer   • Drug use: Defer   • Sexual activity: Defer       Immunizations:   Immunization History   Administered Date(s) Administered   • COVID-19 (MODERNA) 1st, 2nd, 3rd Dose Only 2021, 2021, 10/28/2021, 2022   • COVID-19 (MODERNA) BIVALENT BOOSTER 12+YRS 2022   • Flu Vaccine Split Quad 2019, 2020, 10/28/2021, 2023   • Flublok 18+yrs 2019, 2020, 10/28/2021   • Pneumococcal Conjugate 13-Valent (PCV13) 2018   • Pneumococcal Polysaccharide (PPSV23)  "06/14/2016   • Tdap 07/29/2018        Medications:     Current Outpatient Medications:   •  atorvastatin (LIPITOR) 20 MG tablet, Take 4 tablets by mouth Daily., Disp: , Rfl:   •  bumetanide (BUMEX) 0.5 MG tablet, Take 1 tablet by mouth Daily As Needed (swelling)., Disp: 15 tablet, Rfl: 1  •  empagliflozin (Jardiance) 25 MG tablet tablet, Take 1 tablet by mouth Daily., Disp: 30 tablet, Rfl: 1  •  losartan (COZAAR) 100 MG tablet, Take 1 tablet by mouth Daily., Disp: 90 tablet, Rfl: 3  •  meclizine (ANTIVERT) 25 MG tablet, Take 1 tablet by mouth 3 (Three) Times a Day As Needed for Dizziness., Disp: 60 tablet, Rfl: 1  •  omeprazole (priLOSEC) 40 MG capsule, Take 1 capsule by mouth Daily., Disp: 90 capsule, Rfl: 3  •  promethazine (PHENERGAN) 25 MG tablet, TAKE 1 TABLET BY MOUTH EVERY 4 TO 6 HOURS AS NEEDED FOR NAUSEA, Disp: , Rfl:   •  vitamin D (ERGOCALCIFEROL) 1.25 MG (30677 UT) capsule capsule, Take 1 capsule by mouth 2 (Two) Times a Week. Take 1 capsule twice a week, Disp: 26 capsule, Rfl: 1    Allergies:   Allergies   Allergen Reactions   • Cephalosporins Provider Review Needed   • Codeine Provider Review Needed   • Hydrocodone Provider Review Needed   • Penicillin G Pot [Penicillin G] Provider Review Needed   • Ranitidine Provider Review Needed       Objective     Vital Signs  /68 (BP Location: Left arm, Patient Position: Sitting, Cuff Size: Adult)   Pulse 69   Resp 18   Ht 167.6 cm (66\")   Wt 113 kg (249 lb)   SpO2 96%   BMI 40.19 kg/m²   Estimated body mass index is 40.19 kg/m² as calculated from the following:    Height as of this encounter: 167.6 cm (66\").    Weight as of this encounter: 113 kg (249 lb).          Physical Exam  Vitals and nursing note reviewed.   Constitutional:       Appearance: Normal appearance. She is obese.   HENT:      Head: Normocephalic.      Right Ear: External ear normal.      Left Ear: External ear normal.      Nose: Nose normal.   Eyes:      Pupils: Pupils are equal, " round, and reactive to light.   Neck:      Vascular: No carotid bruit.   Cardiovascular:      Rate and Rhythm: Normal rate and regular rhythm.      Pulses: Normal pulses.      Heart sounds: Normal heart sounds.   Pulmonary:      Effort: Pulmonary effort is normal.      Breath sounds: Normal breath sounds.   Musculoskeletal:      Cervical back: Normal range of motion and neck supple.   Skin:     General: Skin is warm and dry.   Neurological:      Mental Status: She is alert.   Psychiatric:         Mood and Affect: Mood normal.          Procedures     Assessment and Plan     1. Primary hypertension  Controlled at the moment  - Comprehensive Metabolic Panel; Future  - Comprehensive Metabolic Panel    2. Mixed hyperlipidemia  Lets see what is done up to 80 atorvastatin  - Lipid Panel; Future  - Lipid Panel    3. Vitamin D deficiency  50,000 units twice a week I go no further than that because she has having no diarrhea  - Vitamin D,25-Hydroxy; Future  - Vitamin D,25-Hydroxy    4. Prediabetes  6.4 last time  - Hemoglobin A1c; Future  - Hemoglobin A1c    5. Swelling of lower leg      6. Irritable bowel syndrome with diarrhea  Need to rule out order for stool for CNS and for O&P and for white cells and for C. difficile toxin    7. Gastroesophageal reflux disease with esophagitis without hemorrhage  Controlled       Follow Up  Return in about 4 weeks (around 5/17/2023) for shot only.    [facility]  [address] [facility] [address] [phone]

## 2023-04-20 ENCOUNTER — TELEPHONE (OUTPATIENT)
Dept: FAMILY MEDICINE CLINIC | Facility: CLINIC | Age: 63
End: 2023-04-20
Payer: MEDICARE

## 2023-04-20 LAB
25(OH)D3+25(OH)D2 SERPL-MCNC: 15.7 NG/ML (ref 30–100)
ALBUMIN SERPL-MCNC: 3.9 G/DL (ref 3.8–4.8)
ALBUMIN/GLOB SERPL: 1.4 {RATIO} (ref 1.2–2.2)
ALP SERPL-CCNC: 85 IU/L (ref 44–121)
ALT SERPL-CCNC: 27 IU/L (ref 0–32)
AST SERPL-CCNC: 22 IU/L (ref 0–40)
BILIRUB SERPL-MCNC: 0.7 MG/DL (ref 0–1.2)
BUN SERPL-MCNC: 15 MG/DL (ref 8–27)
BUN/CREAT SERPL: 16 (ref 12–28)
CALCIUM SERPL-MCNC: 9.3 MG/DL (ref 8.7–10.3)
CHLORIDE SERPL-SCNC: 101 MMOL/L (ref 96–106)
CHOLEST SERPL-MCNC: 215 MG/DL (ref 100–199)
CO2 SERPL-SCNC: 23 MMOL/L (ref 20–29)
CREAT SERPL-MCNC: 0.92 MG/DL (ref 0.57–1)
EGFRCR SERPLBLD CKD-EPI 2021: 69 ML/MIN/1.73
GLOBULIN SER CALC-MCNC: 2.8 G/DL (ref 1.5–4.5)
GLUCOSE SERPL-MCNC: 130 MG/DL (ref 70–99)
HBA1C MFR BLD: 6.6 % (ref 4.8–5.6)
HDLC SERPL-MCNC: 69 MG/DL
LDLC SERPL CALC-MCNC: 130 MG/DL (ref 0–99)
POTASSIUM SERPL-SCNC: 3.9 MMOL/L (ref 3.5–5.2)
PROT SERPL-MCNC: 6.7 G/DL (ref 6–8.5)
SODIUM SERPL-SCNC: 139 MMOL/L (ref 134–144)
TRIGL SERPL-MCNC: 93 MG/DL (ref 0–149)
VLDLC SERPL CALC-MCNC: 16 MG/DL (ref 5–40)

## 2023-04-20 NOTE — TELEPHONE ENCOUNTER
Caller: Kandy Stallings    Relationship: Self    Best call back number: [phone]    Who are you requesting to speak with (clinical staff, provider,  specific staff member): CLINICAL     What was the call regarding: RESULTS     Do you require a callback: YES

## 2023-04-21 ENCOUNTER — TELEPHONE (OUTPATIENT)
Dept: FAMILY MEDICINE CLINIC | Facility: CLINIC | Age: 63
End: 2023-04-21
Payer: MEDICARE

## 2023-04-21 NOTE — TELEPHONE ENCOUNTER
Caller: Kandy Stallings    Relationship: Self    Best call back number: [phone]    Caller requesting test results: YES    What test was performed: CULTURE    When was the test performed: 4.20.23    Where was the test performed: HOSPITAL    Additional notes: PLEASE CALL TO DISCUSS THESE RESULTS AS SOON AS POSSIBLE.    THANK YOU.

## 2023-04-24 ENCOUNTER — TELEPHONE (OUTPATIENT)
Dept: FAMILY MEDICINE CLINIC | Facility: CLINIC | Age: 63
End: 2023-04-24
Payer: MEDICARE

## 2023-04-25 RX ORDER — METRONIDAZOLE 500 MG/1
500 TABLET ORAL 3 TIMES DAILY
Qty: 42 TABLET | Refills: 0 | Status: SHIPPED | OUTPATIENT
Start: 2023-04-25

## 2023-05-15 ENCOUNTER — OFFICE VISIT (OUTPATIENT)
Dept: FAMILY MEDICINE CLINIC | Facility: CLINIC | Age: 63
End: 2023-05-15
Payer: MEDICARE

## 2023-05-15 VITALS
DIASTOLIC BLOOD PRESSURE: 70 MMHG | HEART RATE: 81 BPM | HEIGHT: 66 IN | BODY MASS INDEX: 40.66 KG/M2 | SYSTOLIC BLOOD PRESSURE: 120 MMHG | WEIGHT: 253 LBS | OXYGEN SATURATION: 95 %

## 2023-05-15 DIAGNOSIS — I10 PRIMARY HYPERTENSION: Primary | ICD-10-CM

## 2023-05-15 DIAGNOSIS — M54.50 PAIN IN RIGHT LUMBAR REGION OF BACK: ICD-10-CM

## 2023-05-15 RX ORDER — TRIAMCINOLONE ACETONIDE 40 MG/ML
40 INJECTION, SUSPENSION INTRA-ARTICULAR; INTRAMUSCULAR ONCE
Status: COMPLETED | OUTPATIENT
Start: 2023-05-15 | End: 2023-05-15

## 2023-05-15 RX ADMIN — TRIAMCINOLONE ACETONIDE 40 MG: 40 INJECTION, SUSPENSION INTRA-ARTICULAR; INTRAMUSCULAR at 15:51

## 2023-05-15 NOTE — PROGRESS NOTES
"    Office Note     Name: Kandy Stallings    : 1960     MRN: [id number]     Chief Complaint  Med Refill (Follow up on C-diff )and a few other things    Subjective     History of Present Illness:  Kandy Stallings is a 63 y.o. female who presents today for she took all the C. difficile antibiotics, Flagyl 500 3 times daily x14 days and the diarrhea quit    She had her right leg quivering not cramping.  Hands cramping, \"drawing on her\" but B12 and folate within normal limits.    I have a feeling that the diarrhea is quit her vitamin D is within normal limits 3 months    She is to right lumbar discomfort, needs shot of Kenalog    Review of Systems:   Review of Systems    Past Medical History:   Past Medical History:   Diagnosis Date   • Bell's palsy    • Chondromalacia     arthoscopy   • Environmental allergies     completed allergy shots   • Tonsillitis        Past Surgical History:   Past Surgical History:   Procedure Laterality Date   • CHOLECYSTECTOMY     • KNEE SURGERY     • TONSILLECTOMY         Family History: History reviewed. No pertinent family history.    Social History:   Social History     Socioeconomic History   • Marital status: Single   Tobacco Use   • Smoking status: Never   • Smokeless tobacco: Never   Vaping Use   • Vaping Use: Never used   Substance and Sexual Activity   • Alcohol use: Defer   • Drug use: Defer   • Sexual activity: Defer       Immunizations:   Immunization History   Administered Date(s) Administered   • COVID-19 (MODERNA) 1st,2nd,3rd Dose Monovalent 2021, 2021, 10/28/2021, 2022   • COVID-19 (MODERNA) BIVALENT 12+YRS 2022   • Flu Vaccine Split Quad 2019, 2020, 10/28/2021, 2023   • Flublok 18+yrs 2019, 2020, 10/28/2021   • Pneumococcal Conjugate 13-Valent (PCV13) 2018   • Pneumococcal Polysaccharide (PPSV23) 2016   • Tdap 2018        Medications:     Current Outpatient Medications:   •  atorvastatin " "(LIPITOR) 20 MG tablet, Take 4 tablets by mouth Daily., Disp: , Rfl:   •  bumetanide (BUMEX) 0.5 MG tablet, Take 1 tablet by mouth Daily As Needed (swelling)., Disp: 15 tablet, Rfl: 1  •  empagliflozin (Jardiance) 25 MG tablet tablet, Take 1 tablet by mouth Daily., Disp: 30 tablet, Rfl: 1  •  losartan (COZAAR) 100 MG tablet, Take 1 tablet by mouth Daily., Disp: 90 tablet, Rfl: 3  •  meclizine (ANTIVERT) 25 MG tablet, Take 1 tablet by mouth 3 (Three) Times a Day As Needed for Dizziness., Disp: 60 tablet, Rfl: 1  •  omeprazole (priLOSEC) 40 MG capsule, Take 1 capsule by mouth Daily., Disp: 90 capsule, Rfl: 3  •  promethazine (PHENERGAN) 25 MG tablet, TAKE 1 TABLET BY MOUTH EVERY 4 TO 6 HOURS AS NEEDED FOR NAUSEA, Disp: , Rfl:   •  vitamin D (ERGOCALCIFEROL) 1.25 MG (62661 UT) capsule capsule, Take 1 capsule by mouth 2 (Two) Times a Week. Take 1 capsule twice a week, Disp: 26 capsule, Rfl: 1  •  metroNIDAZOLE (Flagyl) 500 MG tablet, Take 1 tablet by mouth 3 (Three) Times a Day. Make sure you are taking probiotics, Disp: 42 tablet, Rfl: 0  No current facility-administered medications for this visit.    Allergies:   Allergies   Allergen Reactions   • Cephalosporins Provider Review Needed   • Codeine Provider Review Needed   • Hydrocodone Provider Review Needed   • Penicillin G Pot [Penicillin G] Provider Review Needed   • Ranitidine Provider Review Needed       Objective     Vital Signs  /70 (BP Location: Left arm, Patient Position: Sitting, Cuff Size: Large Adult)   Pulse 81   Ht 167.6 cm (66\")   Wt 115 kg (253 lb)   SpO2 95%   BMI 40.84 kg/m²   Estimated body mass index is 40.84 kg/m² as calculated from the following:    Height as of this encounter: 167.6 cm (66\").    Weight as of this encounter: 115 kg (253 lb).          Physical Exam  Vitals and nursing note reviewed.   Constitutional:       Appearance: Normal appearance. She is obese.   HENT:      Head: Normocephalic.      Right Ear: External ear normal. "      Left Ear: External ear normal.      Nose: Nose normal.   Eyes:      Pupils: Pupils are equal, round, and reactive to light.   Neck:      Vascular: No carotid bruit.   Cardiovascular:      Rate and Rhythm: Normal rate and regular rhythm.      Pulses: Normal pulses.      Heart sounds: Normal heart sounds.   Musculoskeletal:         General: Normal range of motion.      Cervical back: Normal range of motion and neck supple.   Skin:     General: Skin is warm and dry.   Neurological:      Mental Status: She is alert.   Psychiatric:         Mood and Affect: Mood normal.          Procedures     Assessment and Plan     1. Primary hypertension  Well-controlled    2. Pain in right lumbar region of back  Well-controlled we will see what 1 cc of Kenalog will do for her  - triamcinolone acetonide (KENALOG-40) injection 40 mg  3. C. Diff treated,       Follow Up  No follow-ups on file.    [facility]  [address] [facility] [id number] [phone]

## 2023-05-18 ENCOUNTER — TELEPHONE (OUTPATIENT)
Dept: FAMILY MEDICINE CLINIC | Facility: CLINIC | Age: 63
End: 2023-05-18

## 2023-05-18 NOTE — TELEPHONE ENCOUNTER
Caller: Kandy Stallings    Relationship: Self    Best call back number: [phone]    What is the best time to reach you: ANYTIME    Who are you requesting to speak with (clinical staff, provider,  specific staff member): PROVIDER OR CLINICAL STAFF    What was the call regarding: PATIENT STATES THAT SHE IS STILL IN PAIN AND STATES THAT THE INJECTION WAS WORKING UNTIL SHE HAD TO HELP HER SISTER OFF THE FLOOR. PATIENT WOULD LIKE TO KNOW WHAT SHE CAN TAKE. PLEASE ADVISE    Do you require a callback:YES

## 2023-07-27 ENCOUNTER — EXTERNAL PBMM DATA (OUTPATIENT)
Dept: PHARMACY | Facility: OTHER | Age: 63
End: 2023-07-27
Payer: MEDICARE

## 2023-07-28 ENCOUNTER — TELEPHONE (OUTPATIENT)
Dept: CARDIOLOGY | Facility: CLINIC | Age: 63
End: 2023-07-28
Payer: MEDICARE

## 2023-07-28 NOTE — TELEPHONE ENCOUNTER
Caller: Kandy Stallings    Relationship: Self    Best call back number: [phone]     What was the call regarding: PT HAD A  FOLLOW APPT ON 6/15/23 AND DUE TO UNFORSEEN CIRCUMSTANCES, THEY HAD TO RESCHEDULE TO 8/3/23. PT IS OUT OF TOWN UNTIL SEPT. NEXT AVAILABLE PER PT PREFERENCE WAS  9/14/23. JUST WANTED TO MAKE OFFICE AWARE THIS MAKES THEIR JUNE FOLLOW UP A SEPTEMBER FOLLOW UP.

## 2023-07-28 NOTE — TELEPHONE ENCOUNTER
CALLED PATIENT LEFT VM TO LET HER KNOW THAT HER FU APPOINTMENT IS FINE TO BE SCHEDULED IN SEPTEMBER.  OK FOR HUB TO READ.

## 2023-08-18 ENCOUNTER — OFFICE VISIT (OUTPATIENT)
Dept: FAMILY MEDICINE CLINIC | Facility: CLINIC | Age: 63
End: 2023-08-18
Payer: MEDICARE

## 2023-08-18 VITALS
HEIGHT: 66 IN | OXYGEN SATURATION: 98 % | SYSTOLIC BLOOD PRESSURE: 132 MMHG | WEIGHT: 254 LBS | RESPIRATION RATE: 20 BRPM | BODY MASS INDEX: 40.82 KG/M2 | DIASTOLIC BLOOD PRESSURE: 88 MMHG | TEMPERATURE: 98.1 F | HEART RATE: 58 BPM

## 2023-08-18 DIAGNOSIS — M19.90 ARTHRITIS: ICD-10-CM

## 2023-08-18 DIAGNOSIS — R35.0 URINARY FREQUENCY: Primary | ICD-10-CM

## 2023-08-18 LAB
BILIRUB BLD-MCNC: NEGATIVE MG/DL
CLARITY, POC: CLEAR
COLOR UR: YELLOW
EXPIRATION DATE: ABNORMAL
GLUCOSE UR STRIP-MCNC: NEGATIVE MG/DL
KETONES UR QL: NEGATIVE
LEUKOCYTE EST, POC: ABNORMAL
Lab: ABNORMAL
NITRITE UR-MCNC: POSITIVE MG/ML
PH UR: 5.5 [PH] (ref 5–8)
PROT UR STRIP-MCNC: ABNORMAL MG/DL
RBC # UR STRIP: ABNORMAL /UL
SP GR UR: 1.02 (ref 1–1.03)
UROBILINOGEN UR QL: ABNORMAL

## 2023-08-18 PROCEDURE — 3075F SYST BP GE 130 - 139MM HG: CPT | Performed by: PHYSICIAN ASSISTANT

## 2023-08-18 PROCEDURE — 81003 URINALYSIS AUTO W/O SCOPE: CPT | Performed by: PHYSICIAN ASSISTANT

## 2023-08-18 PROCEDURE — 3079F DIAST BP 80-89 MM HG: CPT | Performed by: PHYSICIAN ASSISTANT

## 2023-08-18 PROCEDURE — 99213 OFFICE O/P EST LOW 20 MIN: CPT | Performed by: PHYSICIAN ASSISTANT

## 2023-08-18 RX ORDER — SULFAMETHOXAZOLE AND TRIMETHOPRIM 800; 160 MG/1; MG/1
1 TABLET ORAL 2 TIMES DAILY
Qty: 20 TABLET | Refills: 0 | Status: SHIPPED | OUTPATIENT
Start: 2023-08-18 | End: 2023-08-28

## 2023-08-18 RX ORDER — IBUPROFEN 600 MG/1
600 TABLET ORAL EVERY 6 HOURS PRN
Qty: 60 TABLET | Refills: 0 | Status: SHIPPED | OUTPATIENT
Start: 2023-08-18

## 2023-08-18 NOTE — PROGRESS NOTES
".Chief Complaint  Urinary Urgency (Burning )    Subjective          History of Present Illness  Kandy Stallings is here today with urinary frequency and burning    Patient states that yesterday it started burning when she pees and her stream.  She states that she has had a lot of frequency and urgency as well.  She denies any fever and notes that she had 1 day where she had some blood in her urine but none since.  She states that she had a UTI in the past but has not had 1 for at least a year.  She does state however she has had some burning with urination on and off since June.    Patient states that the middle knuckle on her middle finger of right hand has stiffness and some swelling.  Is taking no pain medications for this.  She has a history of arthritis but not in her hands.    Objective   Vital Signs:   /88   Pulse 58   Temp 98.1 øF (36.7 øC)   Resp 20   Ht 167.6 cm (65.98\")   Wt 115 kg (254 lb)   SpO2 98%   BMI 41.02 kg/mý     Body mass index is 41.02 kg/mý.      Review of Systems      Current Outpatient Medications:     atorvastatin (LIPITOR) 20 MG tablet, Take 4 tablets by mouth Daily., Disp: , Rfl:     bumetanide (BUMEX) 0.5 MG tablet, Take 1 tablet by mouth Daily As Needed (swelling)., Disp: 15 tablet, Rfl: 1    Jardiance 25 MG tablet tablet, Take 1 tablet by mouth once daily, Disp: 30 tablet, Rfl: 0    losartan (COZAAR) 100 MG tablet, Take 1 tablet by mouth Daily., Disp: 90 tablet, Rfl: 3    meclizine (ANTIVERT) 25 MG tablet, Take 1 tablet by mouth 3 (Three) Times a Day As Needed for Dizziness., Disp: 60 tablet, Rfl: 1    omeprazole (priLOSEC) 40 MG capsule, Take 1 capsule by mouth Daily., Disp: 90 capsule, Rfl: 3    promethazine (PHENERGAN) 25 MG tablet, TAKE 1 TABLET BY MOUTH EVERY 4 TO 6 HOURS AS NEEDED FOR NAUSEA, Disp: , Rfl:     ibuprofen (ADVIL,MOTRIN) 600 MG tablet, Take 1 tablet by mouth Every 6 (Six) Hours As Needed for Mild Pain or Moderate Pain., Disp: 60 tablet, Rfl: " 0    metroNIDAZOLE (Flagyl) 500 MG tablet, Take 1 tablet by mouth 3 (Three) Times a Day. Make sure you are taking probiotics (Patient not taking: Reported on 8/18/2023), Disp: 42 tablet, Rfl: 0    sulfamethoxazole-trimethoprim (Bactrim DS) 800-160 MG per tablet, Take 1 tablet by mouth 2 (Two) Times a Day for 10 days., Disp: 20 tablet, Rfl: 0    vitamin D (ERGOCALCIFEROL) 1.25 MG (54565 UT) capsule capsule, Take 1 capsule by mouth 2 (Two) Times a Week. Take 1 capsule twice a week (Patient not taking: Reported on 8/18/2023), Disp: 26 capsule, Rfl: 1    Allergies: Cephalosporins, Codeine, Hydrocodone, Penicillin g pot [penicillin g], and Ranitidine    Physical Exam  Vitals and nursing note reviewed.   Constitutional:       Appearance: Normal appearance.   HENT:      Head: Normocephalic and atraumatic.      Right Ear: Tympanic membrane, ear canal and external ear normal.      Left Ear: Tympanic membrane, ear canal and external ear normal.      Nose: Nose normal.      Mouth/Throat:      Mouth: Mucous membranes are moist.   Eyes:      Pupils: Pupils are equal, round, and reactive to light.   Cardiovascular:      Rate and Rhythm: Normal rate and regular rhythm.      Heart sounds: Normal heart sounds.   Pulmonary:      Effort: Pulmonary effort is normal.      Breath sounds: Normal breath sounds.   Musculoskeletal:         General: Normal range of motion.        Hands:    Skin:     General: Skin is warm.   Neurological:      General: No focal deficit present.      Mental Status: She is alert.   Psychiatric:         Mood and Affect: Mood normal.        Result Review :     POCT urinalysis dipstick, automated (08/18/2023 09:26)               Assessment and Plan    Diagnoses and all orders for this visit:    1. Urinary frequency (Primary)  -     POCT urinalysis dipstick, automated  -     sulfamethoxazole-trimethoprim (Bactrim DS) 800-160 MG per tablet; Take 1 tablet by mouth 2 (Two) Times a Day for 10 days.  Dispense: 20  tablet; Refill: 0  Will send for culture- if symptoms worsen to call or ED  2. Arthritis  -     ibuprofen (ADVIL,MOTRIN) 600 MG tablet; Take 1 tablet by mouth Every 6 (Six) Hours As Needed for Mild Pain or Moderate Pain.  Dispense: 60 tablet; Refill: 0  Will add ibuprofen once a day as needed. If sx worsen or swelling increases to call office      Follow Up   No follow-ups on file.  Patient was given instructions and counseling regarding her condition or for health maintenance advice. Please see specific information pulled into the AVS if appropriate.     OVIDIO Matson  08/18/2023

## 2023-09-13 ENCOUNTER — LAB (OUTPATIENT)
Dept: FAMILY MEDICINE CLINIC | Facility: CLINIC | Age: 63
End: 2023-09-13
Payer: MEDICARE

## 2023-09-14 ENCOUNTER — TELEPHONE (OUTPATIENT)
Dept: FAMILY MEDICINE CLINIC | Facility: CLINIC | Age: 63
End: 2023-09-14
Payer: MEDICARE

## 2023-09-14 LAB
25(OH)D3+25(OH)D2 SERPL-MCNC: 12.2 NG/ML (ref 30–100)
HBA1C MFR BLD: 6.6 % (ref 4.8–5.6)

## 2023-09-14 NOTE — TELEPHONE ENCOUNTER
Caller: Kandy Stallings    Relationship to patient: Self    Best call back number: [phone]     Date of exposure: UNKNOWN    Date of positive COVID19 test: 09.14.23    Date if possible COVID19 exposure: UNKNOWN    COVID19 symptoms: BODY ACHES, SORE THROAT, FEVER    Date of initial quarantine: 09.14.23    Additional information or concerns: PLEASE ADVISE ON WHAT TO DO    What is the patients preferred pharmacy: [facility]  [phone]

## 2023-09-14 NOTE — TELEPHONE ENCOUNTER
Contacted pt. Told her to quarantine for 7 days. Wear mask if have to go out. Treat symptoms with ibprofen or tylenol rotating between the two every 6-8 hrs prn. And if symptoms get worse to go to er for evaluation

## 2023-09-15 DIAGNOSIS — E55.9 VITAMIN D DEFICIENCY: Primary | ICD-10-CM

## 2023-09-15 RX ORDER — ERGOCALCIFEROL 1.25 MG/1
50000 CAPSULE ORAL 2 TIMES WEEKLY
Qty: 180 CAPSULE | Refills: 2 | Status: SHIPPED | OUTPATIENT
Start: 2023-09-18

## 2023-09-15 NOTE — TELEPHONE ENCOUNTER
Pt contacted with understanding. Plus pt wanted refill on vit d pills. Gave 180 pills with 2 refills

## 2023-09-28 ENCOUNTER — OFFICE VISIT (OUTPATIENT)
Dept: FAMILY MEDICINE CLINIC | Facility: CLINIC | Age: 63
End: 2023-09-28
Payer: MEDICARE

## 2023-09-28 VITALS
SYSTOLIC BLOOD PRESSURE: 128 MMHG | HEART RATE: 60 BPM | WEIGHT: 250 LBS | HEIGHT: 66 IN | BODY MASS INDEX: 40.18 KG/M2 | DIASTOLIC BLOOD PRESSURE: 82 MMHG | OXYGEN SATURATION: 99 %

## 2023-09-28 DIAGNOSIS — R06.00 PERSISTENT DYSPNEA AFTER COVID-19: ICD-10-CM

## 2023-09-28 DIAGNOSIS — R30.0 BURNING WITH URINATION: Primary | ICD-10-CM

## 2023-09-28 DIAGNOSIS — U09.9 PERSISTENT DYSPNEA AFTER COVID-19: ICD-10-CM

## 2023-09-28 LAB
BILIRUB BLD-MCNC: NEGATIVE MG/DL
CLARITY, POC: CLEAR
COLOR UR: NORMAL
EXPIRATION DATE: NORMAL
EXPIRATION DATE: NORMAL
GLUCOSE UR STRIP-MCNC: NEGATIVE MG/DL
KETONES UR QL: NEGATIVE
LEUKOCYTE EST, POC: NEGATIVE
Lab: NORMAL
Lab: NORMAL
NITRITE UR-MCNC: NEGATIVE MG/ML
PH UR: 6.5 [PH] (ref 5–8)
POC CREATININE URINE: 300
POC MICROALBUMIN URINE: 80
PROT UR STRIP-MCNC: NEGATIVE MG/DL
RBC # UR STRIP: NEGATIVE /UL
SP GR UR: 1.03 (ref 1–1.03)
UROBILINOGEN UR QL: NORMAL

## 2023-09-28 PROCEDURE — 1160F RVW MEDS BY RX/DR IN RCRD: CPT | Performed by: FAMILY MEDICINE

## 2023-09-28 PROCEDURE — 81003 URINALYSIS AUTO W/O SCOPE: CPT | Performed by: FAMILY MEDICINE

## 2023-09-28 PROCEDURE — 3079F DIAST BP 80-89 MM HG: CPT | Performed by: FAMILY MEDICINE

## 2023-09-28 PROCEDURE — 82044 UR ALBUMIN SEMIQUANTITATIVE: CPT | Performed by: FAMILY MEDICINE

## 2023-09-28 PROCEDURE — 3074F SYST BP LT 130 MM HG: CPT | Performed by: FAMILY MEDICINE

## 2023-09-28 PROCEDURE — 99214 OFFICE O/P EST MOD 30 MIN: CPT | Performed by: FAMILY MEDICINE

## 2023-09-28 PROCEDURE — 1159F MED LIST DOCD IN RCRD: CPT | Performed by: FAMILY MEDICINE

## 2023-09-28 NOTE — PROGRESS NOTES
Office Note     Name: Kandy Stallings    : 1960     MRN: [id number]     Chief Complaint  Hypertension (Follow up.)    Subjective     History of Present Illness:  Kandy Stallings is a 63 y.o. female who presents today for hypertension, post COVID shortness of breath, to make sure she got rid of her UTI.  The urine is normal she is out of breath despite her of 5 injections she got the COVID-19, did not have any smelling indifference, temperature 104.5.  She got the day after her sister got it.  She is a candidate for quadrant    Review of Systems:   Review of Systems    Past Medical History:   Past Medical History:   Diagnosis Date    Bell's palsy 2006    Chondromalacia     arthoscopy    Environmental allergies     completed allergy shots    Tonsillitis        Past Surgical History:   Past Surgical History:   Procedure Laterality Date    CHOLECYSTECTOMY      KNEE SURGERY      TONSILLECTOMY         Family History: History reviewed. No pertinent family history.    Social History:   Social History     Socioeconomic History    Marital status: Single   Tobacco Use    Smoking status: Never    Smokeless tobacco: Never   Vaping Use    Vaping Use: Never used   Substance and Sexual Activity    Alcohol use: Defer    Drug use: Defer    Sexual activity: Defer       Immunizations:   Immunization History   Administered Date(s) Administered    COVID-19 (MODERNA) 1st,2nd,3rd Dose Monovalent 2021, 2021, 10/28/2021, 2022    COVID-19 (MODERNA) BIVALENT 12+YRS 2022    Flu Vaccine Split Quad 2019, 2020, 10/28/2021, 2023    Flublok 18+yrs 2019, 2020, 10/28/2021, 2023    Pneumococcal Conjugate 13-Valent (PCV13) 2018    Pneumococcal Polysaccharide (PPSV23) 2016    Tdap 2018        Medications:     Current Outpatient Medications:     bumetanide (BUMEX) 0.5 MG tablet, Take 1 tablet by mouth Daily As Needed (swelling)., Disp: 15 tablet, Rfl: 1     "ibuprofen (ADVIL,MOTRIN) 600 MG tablet, Take 1 tablet by mouth Every 6 (Six) Hours As Needed for Mild Pain or Moderate Pain., Disp: 60 tablet, Rfl: 0    losartan (COZAAR) 100 MG tablet, Take 1 tablet by mouth Daily., Disp: 90 tablet, Rfl: 3    meclizine (ANTIVERT) 25 MG tablet, Take 1 tablet by mouth 3 (Three) Times a Day As Needed for Dizziness., Disp: 60 tablet, Rfl: 1    omeprazole (priLOSEC) 40 MG capsule, Take 1 capsule by mouth Daily., Disp: 90 capsule, Rfl: 3    vitamin D (ERGOCALCIFEROL) 1.25 MG (80229 UT) capsule capsule, Take 1 capsule by mouth 2 (Two) Times a Week. Take 1 capsule twice a week, Disp: 180 capsule, Rfl: 2    atorvastatin (LIPITOR) 20 MG tablet, Take 4 tablets by mouth Daily. (Patient not taking: Reported on 9/28/2023), Disp: , Rfl:     Jardiance 25 MG tablet tablet, Take 1 tablet by mouth once daily (Patient not taking: Reported on 9/28/2023), Disp: 30 tablet, Rfl: 0    metroNIDAZOLE (Flagyl) 500 MG tablet, Take 1 tablet by mouth 3 (Three) Times a Day. Make sure you are taking probiotics (Patient not taking: Reported on 9/28/2023), Disp: 42 tablet, Rfl: 0    promethazine (PHENERGAN) 25 MG tablet, TAKE 1 TABLET BY MOUTH EVERY 4 TO 6 HOURS AS NEEDED FOR NAUSEA (Patient not taking: Reported on 9/28/2023), Disp: , Rfl:     Allergies:   Allergies   Allergen Reactions    Cephalosporins Provider Review Needed    Codeine Provider Review Needed    Hydrocodone Provider Review Needed    Penicillin G Pot [Penicillin G] Provider Review Needed    Ranitidine Provider Review Needed       Objective     Vital Signs  /82   Pulse 60   Ht 167.6 cm (65.98\")   Wt 113 kg (250 lb)   SpO2 99%   BMI 40.37 kg/m²   Estimated body mass index is 40.37 kg/m² as calculated from the following:    Height as of this encounter: 167.6 cm (65.98\").    Weight as of this encounter: 113 kg (250 lb).          Physical Exam  Vitals and nursing note reviewed.   Constitutional:       Appearance: Normal appearance. She is " obese.   HENT:      Head: Normocephalic.      Right Ear: External ear normal.      Left Ear: External ear normal.      Nose: Nose normal.   Eyes:      Pupils: Pupils are equal, round, and reactive to light.   Neck:      Vascular: No carotid bruit.   Cardiovascular:      Rate and Rhythm: Normal rate and regular rhythm.      Pulses: Normal pulses.      Heart sounds: Normal heart sounds.   Pulmonary:      Effort: Pulmonary effort is normal.      Breath sounds: Normal breath sounds.   Musculoskeletal:      Cervical back: Normal range of motion and neck supple.   Skin:     General: Skin is warm and dry.   Neurological:      Mental Status: She is alert.   Psychiatric:         Mood and Affect: Mood normal.        Procedures     Assessment and Plan     1. Burning with urination  Will have a culture if needed  - Urine Culture - Urine, Urine, Clean Catch  - POC Urinalysis Dipstick, Automated  - POC Microalbumin    2. Persistent dyspnea after COVID-19  Shortness of breath after COVID-19       Follow Up  Return if symptoms worsen or fail to improve.    Suman DONAHUE [facility]  [address] [place] [id number] [phone]

## 2023-09-30 LAB
BACTERIA UR CULT: NORMAL
BACTERIA UR CULT: NORMAL

## 2023-10-03 ENCOUNTER — TELEPHONE (OUTPATIENT)
Dept: FAMILY MEDICINE CLINIC | Facility: CLINIC | Age: 63
End: 2023-10-03

## 2023-10-05 ENCOUNTER — OFFICE VISIT (OUTPATIENT)
Dept: FAMILY MEDICINE CLINIC | Facility: CLINIC | Age: 63
End: 2023-10-05
Payer: MEDICARE

## 2023-10-05 VITALS
OXYGEN SATURATION: 98 % | DIASTOLIC BLOOD PRESSURE: 78 MMHG | HEIGHT: 66 IN | WEIGHT: 250 LBS | SYSTOLIC BLOOD PRESSURE: 130 MMHG | HEART RATE: 60 BPM | BODY MASS INDEX: 40.18 KG/M2

## 2023-10-05 DIAGNOSIS — K21.00 GASTROESOPHAGEAL REFLUX DISEASE WITH ESOPHAGITIS WITHOUT HEMORRHAGE: ICD-10-CM

## 2023-10-05 DIAGNOSIS — J30.89 NON-SEASONAL ALLERGIC RHINITIS DUE TO OTHER ALLERGIC TRIGGER: ICD-10-CM

## 2023-10-05 DIAGNOSIS — R05.9 COUGH, UNSPECIFIED TYPE: Primary | ICD-10-CM

## 2023-10-05 DIAGNOSIS — E11.65 TYPE 2 DIABETES MELLITUS WITH HYPERGLYCEMIA, WITHOUT LONG-TERM CURRENT USE OF INSULIN: ICD-10-CM

## 2023-10-05 DIAGNOSIS — I10 PRIMARY HYPERTENSION: ICD-10-CM

## 2023-10-05 DIAGNOSIS — E78.2 MIXED HYPERLIPIDEMIA: ICD-10-CM

## 2023-10-05 PROBLEM — J30.9 ALLERGIC RHINITIS DUE TO ALLERGEN: Status: ACTIVE | Noted: 2023-10-05

## 2023-10-05 LAB
EXPIRATION DATE: NORMAL
FLUAV AG UPPER RESP QL IA.RAPID: NOT DETECTED
FLUBV AG UPPER RESP QL IA.RAPID: NOT DETECTED
INTERNAL CONTROL: NORMAL
Lab: NORMAL
SARS-COV-2 AG UPPER RESP QL IA.RAPID: NOT DETECTED

## 2023-10-05 PROCEDURE — 1159F MED LIST DOCD IN RCRD: CPT | Performed by: FAMILY MEDICINE

## 2023-10-05 PROCEDURE — 1160F RVW MEDS BY RX/DR IN RCRD: CPT | Performed by: FAMILY MEDICINE

## 2023-10-05 PROCEDURE — 3078F DIAST BP <80 MM HG: CPT | Performed by: FAMILY MEDICINE

## 2023-10-05 PROCEDURE — 99214 OFFICE O/P EST MOD 30 MIN: CPT | Performed by: FAMILY MEDICINE

## 2023-10-05 PROCEDURE — 3075F SYST BP GE 130 - 139MM HG: CPT | Performed by: FAMILY MEDICINE

## 2023-10-05 PROCEDURE — 87428 SARSCOV & INF VIR A&B AG IA: CPT | Performed by: FAMILY MEDICINE

## 2023-10-05 PROCEDURE — 3044F HG A1C LEVEL LT 7.0%: CPT | Performed by: FAMILY MEDICINE

## 2023-10-05 RX ORDER — OMEPRAZOLE 40 MG/1
40 CAPSULE, DELAYED RELEASE ORAL DAILY
Qty: 90 CAPSULE | Refills: 3 | Status: SHIPPED | OUTPATIENT
Start: 2023-10-05

## 2023-10-05 RX ORDER — LOSARTAN POTASSIUM 100 MG/1
100 TABLET ORAL DAILY
Qty: 90 TABLET | Refills: 3 | Status: SHIPPED | OUTPATIENT
Start: 2023-10-05

## 2023-10-05 RX ORDER — FLUTICASONE PROPIONATE 50 MCG
1 SPRAY, SUSPENSION (ML) NASAL NIGHTLY
Qty: 16 G | Refills: 5 | Status: SHIPPED | OUTPATIENT
Start: 2023-10-05

## 2023-10-05 NOTE — PROGRESS NOTES
Office Note     Name: Kandy Stallings    : 1960     MRN: [id number]     Chief Complaint  Sinusitis (2 weeks)    Subjective     History of Present Illness:  Kandy Stallings is a 63 y.o. female who presents today for her sinuses,, 2 weeks.  Also here for hypertension diabetes mellitus she is not taking her antihypertensive and she is not taking her Jardiance    Review of Systems:   Review of Systems    Past Medical History:   Past Medical History:   Diagnosis Date    Bell's palsy 2006    Chondromalacia     arthoscopy    Environmental allergies     completed allergy shots    Tonsillitis        Past Surgical History:   Past Surgical History:   Procedure Laterality Date    CHOLECYSTECTOMY      KNEE SURGERY      TONSILLECTOMY         Family History: History reviewed. No pertinent family history.    Social History:   Social History     Socioeconomic History    Marital status: Single   Tobacco Use    Smoking status: Never    Smokeless tobacco: Never   Vaping Use    Vaping Use: Never used   Substance and Sexual Activity    Alcohol use: Defer    Drug use: Defer    Sexual activity: Defer       Immunizations:   Immunization History   Administered Date(s) Administered    COVID-19 (MODERNA) 1st,2nd,3rd Dose Monovalent 2021, 2021, 10/28/2021, 2022    COVID-19 (MODERNA) BIVALENT 12+YRS 2022    Flu Vaccine Split Quad 2019, 2020, 10/28/2021, 2023    Flublok 18+yrs 2019, 2020, 10/28/2021, 2023    Pneumococcal Conjugate 13-Valent (PCV13) 2018    Pneumococcal Polysaccharide (PPSV23) 2016    Tdap 2018        Medications:     Current Outpatient Medications:     bumetanide (BUMEX) 0.5 MG tablet, Take 1 tablet by mouth Daily As Needed (swelling)., Disp: 15 tablet, Rfl: 1    empagliflozin (Jardiance) 25 MG tablet tablet, Take 1 tablet by mouth Daily., Disp: 90 tablet, Rfl: 1    ibuprofen (ADVIL,MOTRIN) 600 MG tablet, Take 1 tablet by mouth Every 6  "(Six) Hours As Needed for Mild Pain or Moderate Pain., Disp: 60 tablet, Rfl: 0    losartan (COZAAR) 100 MG tablet, Take 1 tablet by mouth Daily., Disp: 90 tablet, Rfl: 3    meclizine (ANTIVERT) 25 MG tablet, Take 1 tablet by mouth 3 (Three) Times a Day As Needed for Dizziness., Disp: 60 tablet, Rfl: 1    omeprazole (priLOSEC) 40 MG capsule, Take 1 capsule by mouth Daily., Disp: 90 capsule, Rfl: 3    promethazine (PHENERGAN) 25 MG tablet, , Disp: , Rfl:     vitamin D (ERGOCALCIFEROL) 1.25 MG (14227 UT) capsule capsule, Take 1 capsule by mouth 2 (Two) Times a Week. Take 1 capsule twice a week, Disp: 180 capsule, Rfl: 2    fluticasone (FLONASE) 50 MCG/ACT nasal spray, 1 spray into the nostril(s) as directed by provider Every Night., Disp: 16 g, Rfl: 5    Allergies:   Allergies   Allergen Reactions    Cephalosporins Provider Review Needed    Codeine Provider Review Needed    Hydrocodone Provider Review Needed    Penicillin G Pot [Penicillin G] Provider Review Needed    Ranitidine Provider Review Needed       Objective     Vital Signs  /78   Pulse 60   Ht 167.6 cm (65.98\")   Wt 113 kg (250 lb)   SpO2 98%   BMI 40.37 kg/m²   Estimated body mass index is 40.37 kg/m² as calculated from the following:    Height as of this encounter: 167.6 cm (65.98\").    Weight as of this encounter: 113 kg (250 lb).          Physical Exam  Vitals and nursing note reviewed.   HENT:      Head: Normocephalic and atraumatic.      Comments: Scarring 2+ on right miles color    Going 1+ on left  2+ PND     Right Ear: Tympanic membrane, ear canal and external ear normal.      Left Ear: Tympanic membrane, ear canal and external ear normal.      Nose: No congestion or rhinorrhea.      Mouth/Throat:      Mouth: Mucous membranes are moist. Mucous membranes are dry.   Eyes:      Extraocular Movements: Extraocular movements intact.      Conjunctiva/sclera: Conjunctivae normal.      Pupils: Pupils are equal, round, and reactive to light. "   Cardiovascular:      Rate and Rhythm: Normal rate and regular rhythm.   Pulmonary:      Effort: Pulmonary effort is normal.      Breath sounds: Normal breath sounds.   Lymphadenopathy:      Cervical: No cervical adenopathy.   Skin:     General: Skin is warm and dry.   Neurological:      General: No focal deficit present.      Mental Status: She is alert.        Procedures     Assessment and Plan     1. Cough, unspecified type  Chest clear  - POCT SARS-CoV-2 Antigen MOOK    2. Primary hypertension  Back on the losartan  - losartan (COZAAR) 100 MG tablet; Take 1 tablet by mouth Daily.  Dispense: 90 tablet; Refill: 3    3. Gastroesophageal reflux disease with esophagitis without hemorrhage    - omeprazole (priLOSEC) 40 MG capsule; Take 1 capsule by mouth Daily.  Dispense: 90 capsule; Refill: 3    4. Non-seasonal allergic rhinitis due to other allergic trigger  Use Flonase 1 spray each nostril avoid the epistaxis    5. Type 2 diabetes mellitus with hyperglycemia, without long-term current use of insulin  Get back on your Jardiance 25 mg    6. Mixed hyperlipidemia         Follow Up  Return in about 2 months (around 12/5/2023).    Suman DONAHUE [facility]  [address] [address] [phone]

## 2023-10-17 ENCOUNTER — OFFICE VISIT (OUTPATIENT)
Dept: FAMILY MEDICINE CLINIC | Facility: CLINIC | Age: 63
End: 2023-10-17
Payer: MEDICARE

## 2023-10-17 VITALS
HEIGHT: 66 IN | WEIGHT: 252 LBS | HEART RATE: 60 BPM | BODY MASS INDEX: 40.5 KG/M2 | SYSTOLIC BLOOD PRESSURE: 110 MMHG | DIASTOLIC BLOOD PRESSURE: 70 MMHG | OXYGEN SATURATION: 98 %

## 2023-10-17 DIAGNOSIS — H92.01 OTALGIA OF RIGHT EAR: ICD-10-CM

## 2023-10-17 DIAGNOSIS — J30.1 NON-SEASONAL ALLERGIC RHINITIS DUE TO POLLEN: ICD-10-CM

## 2023-10-17 DIAGNOSIS — M25.461 EFFUSION OF RIGHT KNEE: Primary | ICD-10-CM

## 2023-10-17 PROCEDURE — 3074F SYST BP LT 130 MM HG: CPT | Performed by: FAMILY MEDICINE

## 2023-10-17 PROCEDURE — 3044F HG A1C LEVEL LT 7.0%: CPT | Performed by: FAMILY MEDICINE

## 2023-10-17 PROCEDURE — 99214 OFFICE O/P EST MOD 30 MIN: CPT | Performed by: FAMILY MEDICINE

## 2023-10-17 PROCEDURE — 1160F RVW MEDS BY RX/DR IN RCRD: CPT | Performed by: FAMILY MEDICINE

## 2023-10-17 PROCEDURE — 3078F DIAST BP <80 MM HG: CPT | Performed by: FAMILY MEDICINE

## 2023-10-17 PROCEDURE — 1159F MED LIST DOCD IN RCRD: CPT | Performed by: FAMILY MEDICINE

## 2023-10-17 RX ORDER — PREDNISONE 20 MG/1
20 TABLET ORAL DAILY
Qty: 20 TABLET | Refills: 0 | Status: SHIPPED | OUTPATIENT
Start: 2023-10-17

## 2023-10-17 RX ORDER — AZITHROMYCIN 250 MG/1
TABLET, FILM COATED ORAL
Qty: 6 TABLET | Refills: 0 | Status: SHIPPED | OUTPATIENT
Start: 2023-10-17

## 2023-10-17 NOTE — ASSESSMENT & PLAN NOTE
Try Flonase 1 spray each nostril cause nosebleed, pharmacist told her to take 1 spray each nostril every other day.  Want to give prednisone

## 2023-10-17 NOTE — PROGRESS NOTES
Office Note     Name: Kandy Stallings    : 1960     MRN: [id number]     Chief Complaint  Sinusitis (Follow up) and Joint Swelling (Right knee. 7days)    Subjective     History of Present Illness:  Kandy Stallings is a 63 y.o. female who presents today for right knee swelling, allergic rhinitis, and right otalgia.  She twisted on it and got and effusion appear medial stays tender patellar she pointed to the posterior she is tender.  Anterior drawer sign posterior drawer sign negative but there is a 3+ effusion in the knee    Review of Systems:   Review of Systems    Past Medical History:   Past Medical History:   Diagnosis Date    Bell's palsy 2006    Chondromalacia     arthoscopy    Environmental allergies     completed allergy shots    Tonsillitis        Past Surgical History:   Past Surgical History:   Procedure Laterality Date    CHOLECYSTECTOMY      KNEE SURGERY      TONSILLECTOMY         Family History: History reviewed. No pertinent family history.    Social History:   Social History     Socioeconomic History    Marital status: Single   Tobacco Use    Smoking status: Never     Passive exposure: Never    Smokeless tobacco: Never   Vaping Use    Vaping Use: Never used   Substance and Sexual Activity    Alcohol use: Defer    Drug use: Defer    Sexual activity: Defer       Immunizations:   Immunization History   Administered Date(s) Administered    COVID-19 (MODERNA) 1st,2nd,3rd Dose Monovalent 2021, 2021, 10/28/2021, 2022    COVID-19 (MODERNA) BIVALENT 12+YRS 2022    Flu Vaccine Split Quad 2019, 2020, 10/28/2021, 2023    Flublok 18+yrs 2019, 2020, 10/28/2021, 2023    Pneumococcal Conjugate 13-Valent (PCV13) 2018    Pneumococcal Polysaccharide (PPSV23) 2016    Tdap 2018        Medications:     Current Outpatient Medications:     bumetanide (BUMEX) 0.5 MG tablet, Take 1 tablet by mouth Daily As Needed (swelling)., Disp: 15  "tablet, Rfl: 1    empagliflozin (Jardiance) 25 MG tablet tablet, Take 1 tablet by mouth Daily., Disp: 90 tablet, Rfl: 1    fluticasone (FLONASE) 50 MCG/ACT nasal spray, 1 spray into the nostril(s) as directed by provider Every Night., Disp: 16 g, Rfl: 5    ibuprofen (ADVIL,MOTRIN) 600 MG tablet, Take 1 tablet by mouth Every 6 (Six) Hours As Needed for Mild Pain or Moderate Pain., Disp: 60 tablet, Rfl: 0    losartan (COZAAR) 100 MG tablet, Take 1 tablet by mouth Daily., Disp: 90 tablet, Rfl: 3    meclizine (ANTIVERT) 25 MG tablet, Take 1 tablet by mouth 3 (Three) Times a Day As Needed for Dizziness., Disp: 60 tablet, Rfl: 1    omeprazole (priLOSEC) 40 MG capsule, Take 1 capsule by mouth Daily., Disp: 90 capsule, Rfl: 3    promethazine (PHENERGAN) 25 MG tablet, , Disp: , Rfl:     vitamin D (ERGOCALCIFEROL) 1.25 MG (62433 UT) capsule capsule, Take 1 capsule by mouth 2 (Two) Times a Week. Take 1 capsule twice a week, Disp: 180 capsule, Rfl: 2    Allergies:   Allergies   Allergen Reactions    Cephalosporins Provider Review Needed    Codeine Provider Review Needed    Hydrocodone Provider Review Needed    Penicillin G Pot [Penicillin G] Provider Review Needed    Ranitidine Provider Review Needed       Objective     Vital Signs  /70   Pulse 60   Ht 167.6 cm (65.98\")   Wt 114 kg (252 lb)   SpO2 98%   BMI 40.69 kg/m²   Estimated body mass index is 40.69 kg/m² as calculated from the following:    Height as of this encounter: 167.6 cm (65.98\").    Weight as of this encounter: 114 kg (252 lb).          Physical Exam  Vitals and nursing note reviewed.   HENT:      Head: Normocephalic and atraumatic.      Comments: TMs look for plus scarred but the right TM have a dusky appearance to it.  Left TM good cone of light     Right Ear: Ear canal and external ear normal.      Left Ear: Ear canal and external ear normal.      Nose: No congestion or rhinorrhea.      Mouth/Throat:      Mouth: Mucous membranes are dry.   Eyes:     "  Extraocular Movements: Extraocular movements intact.      Conjunctiva/sclera: Conjunctivae normal.      Pupils: Pupils are equal, round, and reactive to light.   Cardiovascular:      Rate and Rhythm: Normal rate and regular rhythm.   Pulmonary:      Effort: Pulmonary effort is normal.      Breath sounds: Normal breath sounds.   Musculoskeletal:      Comments: Rt knee with effusion 3+ been going on a week   Lymphadenopathy:      Cervical: No cervical adenopathy.   Skin:     General: Skin is warm and dry.   Neurological:      General: No focal deficit present.      Mental Status: She is alert.          Procedures     Assessment and Plan     1. Effusion of right knee  Must be some torn cartilage, meniscus.    2. Otalgia of right ear  Not opening up on right, antibiotics and prednisone    3. Non-seasonal allergic rhinitis due to pollen  Prednisone will help that       Follow Up  Return in about 4 weeks (around 11/14/2023).    Suman DONAHUE [facility]  [address] [place] [id number]  [phone]

## 2023-10-25 ENCOUNTER — TELEPHONE (OUTPATIENT)
Dept: FAMILY MEDICINE CLINIC | Facility: CLINIC | Age: 63
End: 2023-10-25

## 2023-10-25 NOTE — TELEPHONE ENCOUNTER
Caller: ANDRZEJ ACOSTA    Relationship to patient: Emergency Contact    Best call back number: [phone]     Patient is needing: PATIENTS SISTER STATES HER RIGHT KNEE IS STILL SWOLLEN AND THE MEDICATION SHE WAS GIVEN IS NOT WORKING. PLEASE CALL BACK WITH NEXT STEPS, IF NO ANSWER LEAVE A MESSAGE.

## 2023-10-27 DIAGNOSIS — M25.461 EFFUSION OF RIGHT KNEE: Primary | ICD-10-CM

## 2023-10-31 ENCOUNTER — TELEPHONE (OUTPATIENT)
Dept: FAMILY MEDICINE CLINIC | Facility: CLINIC | Age: 63
End: 2023-10-31

## 2023-10-31 NOTE — TELEPHONE ENCOUNTER
Caller: Kandy Stallings    Relationship: Self    Best call back number: [phone]     Caller requesting test results: YES     What test was performed: XRAY OF RIGHT KNEE     When was the test performed: 10/27/2023    Where was the test performed: [facility]    Additional notes: PATIENT CALLED FOR HER XRAY RESULTS. PLEASE ADVISE

## 2023-11-01 ENCOUNTER — TELEPHONE (OUTPATIENT)
Dept: FAMILY MEDICINE CLINIC | Facility: CLINIC | Age: 63
End: 2023-11-01

## 2023-11-01 NOTE — TELEPHONE ENCOUNTER
Caller: Kandy Stallings    Relationship: Self    Best call back number: [phone]     What is the best time to reach you: ANYTIME    Who are you requesting to speak with (clinical staff, provider,  specific staff member): CLINICAL STAFF    Do you know the name of the person who called: KANDY    What was the call regarding: PATIENT WOULD LIKE TO DISCUSS THEIR XRAY RESULTS FROM 10.27.23 WITH CLINICAL STAFF    PLEASE ADVISE

## 2023-11-06 ENCOUNTER — OFFICE VISIT (OUTPATIENT)
Dept: FAMILY MEDICINE CLINIC | Facility: CLINIC | Age: 63
End: 2023-11-06
Payer: MEDICARE

## 2023-11-06 VITALS
BODY MASS INDEX: 40.66 KG/M2 | HEART RATE: 60 BPM | OXYGEN SATURATION: 98 % | WEIGHT: 253 LBS | DIASTOLIC BLOOD PRESSURE: 70 MMHG | HEIGHT: 66 IN | SYSTOLIC BLOOD PRESSURE: 118 MMHG

## 2023-11-06 DIAGNOSIS — M94.20 CHONDROMALACIA: ICD-10-CM

## 2023-11-06 DIAGNOSIS — M25.561 ACUTE PAIN OF RIGHT KNEE: Primary | ICD-10-CM

## 2023-11-06 PROCEDURE — 99213 OFFICE O/P EST LOW 20 MIN: CPT | Performed by: FAMILY MEDICINE

## 2023-11-06 NOTE — PROGRESS NOTES
Office Note     Name: Kandy Stallings    : 1960     MRN: [id number]     Chief Complaint  Joint Swelling    Subjective     History of Present Illness:  Kandy Stallings is a 63 y.o. female who presents today for joint swelling is been about 3 weeks generalized osteoarthritis suspected to try compartment syndrome, suspected chondrocalcinosis either with CPPD or pseudogout    Review of Systems:   Review of Systems    Past Medical History:   Past Medical History:   Diagnosis Date    Bell's palsy 2006    Chondromalacia 2023    arthoscopy    Environmental allergies     completed allergy shots    Tonsillitis        Past Surgical History:   Past Surgical History:   Procedure Laterality Date    CHOLECYSTECTOMY      KNEE SURGERY      TONSILLECTOMY         Family History: History reviewed. No pertinent family history.    Social History:   Social History     Socioeconomic History    Marital status: Single   Tobacco Use    Smoking status: Never     Passive exposure: Never    Smokeless tobacco: Never   Vaping Use    Vaping Use: Never used   Substance and Sexual Activity    Alcohol use: Defer    Drug use: Defer    Sexual activity: Defer       Immunizations:   Immunization History   Administered Date(s) Administered    COVID-19 (MODERNA) 1st,2nd,3rd Dose Monovalent 2021, 2021, 10/28/2021, 2022    COVID-19 (MODERNA) BIVALENT 12+YRS 2022    Flu Vaccine Split Quad 2019, 2020, 10/28/2021, 2023    Flublok 18+yrs 2019, 2020, 10/28/2021, 2023    Pneumococcal Conjugate 13-Valent (PCV13) 2018    Pneumococcal Polysaccharide (PPSV23) 2016    Tdap 2018        Medications:     Current Outpatient Medications:     bumetanide (BUMEX) 0.5 MG tablet, Take 1 tablet by mouth Daily As Needed (swelling)., Disp: 15 tablet, Rfl: 1    empagliflozin (Jardiance) 25 MG tablet tablet, Take 1 tablet by mouth Daily., Disp: 90 tablet, Rfl: 1    fluticasone (FLONASE) 50  "MCG/ACT nasal spray, 1 spray into the nostril(s) as directed by provider Every Night., Disp: 16 g, Rfl: 5    ibuprofen (ADVIL,MOTRIN) 600 MG tablet, Take 1 tablet by mouth Every 6 (Six) Hours As Needed for Mild Pain or Moderate Pain., Disp: 60 tablet, Rfl: 0    losartan (COZAAR) 100 MG tablet, Take 1 tablet by mouth Daily., Disp: 90 tablet, Rfl: 3    meclizine (ANTIVERT) 25 MG tablet, Take 1 tablet by mouth 3 (Three) Times a Day As Needed for Dizziness., Disp: 60 tablet, Rfl: 1    omeprazole (priLOSEC) 40 MG capsule, Take 1 capsule by mouth Daily., Disp: 90 capsule, Rfl: 3    predniSONE (DELTASONE) 20 MG tablet, Take 1 tablet by mouth Daily., Disp: 20 tablet, Rfl: 0    promethazine (PHENERGAN) 25 MG tablet, , Disp: , Rfl:     vitamin D (ERGOCALCIFEROL) 1.25 MG (87941 UT) capsule capsule, Take 1 capsule by mouth 2 (Two) Times a Week. Take 1 capsule twice a week, Disp: 180 capsule, Rfl: 2    Allergies:   Allergies   Allergen Reactions    Cephalosporins Provider Review Needed    Codeine Provider Review Needed    Hydrocodone Provider Review Needed    Penicillin G Pot [Penicillin G] Provider Review Needed    Ranitidine Provider Review Needed       Objective     Vital Signs  /70   Pulse 60   Ht 167.6 cm (65.98\")   Wt 115 kg (253 lb)   SpO2 98%   BMI 40.86 kg/m²   Estimated body mass index is 40.86 kg/m² as calculated from the following:    Height as of this encounter: 167.6 cm (65.98\").    Weight as of this encounter: 115 kg (253 lb).          Physical Exam  Constitutional:       General: She is not in acute distress.     Appearance: Normal appearance. She is obese. She is not toxic-appearing or diaphoretic.   HENT:      Head: Normocephalic and atraumatic.      Right Ear: External ear normal.      Left Ear: External ear normal.      Nose: Nose normal.   Eyes:      Pupils: Pupils are equal, round, and reactive to light.   Musculoskeletal:         General: Tenderness present.        Legs:       Comments: Effusion " of rt. Knee, tender patella   Skin:     General: Skin is warm and dry.   Neurological:      Mental Status: She is alert.   Psychiatric:         Mood and Affect: Mood normal.         Behavior: Behavior normal.          Procedures     Assessment and Plan     1. Acute pain of right knee  MRI of the knee without contrast be more than to level  - MRI Knee Right Without Contrast; Future    2. Chondromalacia  Still having problems finding a suitable NSAID       Follow Up  Return if symptoms worsen or fail to improve.    Suman DONAHUE [facility]  [address] [place] [id number] [phone]

## 2023-11-16 ENCOUNTER — TELEPHONE (OUTPATIENT)
Dept: FAMILY MEDICINE CLINIC | Facility: CLINIC | Age: 63
End: 2023-11-16

## 2023-11-16 NOTE — TELEPHONE ENCOUNTER
Caller: Kandy Stallings    Relationship: Self    Best call back number: [phone]     What is the best time to reach you: ANYTIME    Who are you requesting to speak with (clinical staff, provider,  specific staff member): CLINICAL STAFF / DR PALMA    Do you know the name of the person who called: KANDY    What was the call regarding: PATIENT STATES THAT SINCE THEY WERE WAITING TO RECEIVE THE SHOT BEFORE THE MRI, AND NOW THAT THE DATE FOR THE MRI HAS BEEN PUSHED OUT SO FAR, THEY WOULD LIKE TO TAKE DR PALMA UP ON HIS OFFER FOR THE SHOT FOR THE PAIN    PLEASE ADVISE   [FreeTextEntry1] : KRISTOFER ALAS is a 18 month old male here for febrile seizures, complex (lasting more than 15 min/ several in 1 day). \par \par Normal development. Non focal exam\par \par + FHx febrile seizures in sister, no FHx epilepsy\par

## 2023-11-28 ENCOUNTER — OFFICE VISIT (OUTPATIENT)
Dept: FAMILY MEDICINE CLINIC | Facility: CLINIC | Age: 63
End: 2023-11-28
Payer: MEDICARE

## 2023-11-28 VITALS
OXYGEN SATURATION: 98 % | HEART RATE: 62 BPM | HEIGHT: 66 IN | SYSTOLIC BLOOD PRESSURE: 120 MMHG | WEIGHT: 257 LBS | DIASTOLIC BLOOD PRESSURE: 70 MMHG | BODY MASS INDEX: 41.3 KG/M2

## 2023-11-28 DIAGNOSIS — M94.20 CHONDROMALACIA: Primary | ICD-10-CM

## 2023-11-28 DIAGNOSIS — S89.91XD INJURY OF RIGHT KNEE, SUBSEQUENT ENCOUNTER: ICD-10-CM

## 2023-11-28 RX ORDER — TRIAMCINOLONE ACETONIDE 40 MG/ML
40 INJECTION, SUSPENSION INTRA-ARTICULAR; INTRAMUSCULAR
Status: COMPLETED | OUTPATIENT
Start: 2023-11-28 | End: 2023-11-28

## 2023-11-28 RX ORDER — LIDOCAINE HYDROCHLORIDE 10 MG/ML
1 INJECTION, SOLUTION INFILTRATION; PERINEURAL
Status: COMPLETED | OUTPATIENT
Start: 2023-11-28 | End: 2023-11-28

## 2023-11-28 RX ADMIN — LIDOCAINE HYDROCHLORIDE 1 ML: 10 INJECTION, SOLUTION INFILTRATION; PERINEURAL at 09:16

## 2023-11-28 RX ADMIN — TRIAMCINOLONE ACETONIDE 40 MG: 40 INJECTION, SUSPENSION INTRA-ARTICULAR; INTRAMUSCULAR at 09:16

## 2023-11-28 NOTE — PROGRESS NOTES
Office Note     Name: Kandy Stallings    : 1960     MRN: [id number]     Chief Complaint  Knee Pain (Wants shot.)    Subjective     History of Present Illness:  Kandy Stallings is a 63 y.o. female who presents today for knee injection.  She has her MRI right knee scheduled for the  of the month    Review of Systems:   Review of Systems    Past Medical History:   Past Medical History:   Diagnosis Date    Bell's palsy 2006    Chondromalacia 2023    arthoscopy    Environmental allergies     completed allergy shots    Tonsillitis        Past Surgical History:   Past Surgical History:   Procedure Laterality Date    CHOLECYSTECTOMY      KNEE SURGERY      TONSILLECTOMY         Family History: History reviewed. No pertinent family history.    Social History:   Social History     Socioeconomic History    Marital status: Single   Tobacco Use    Smoking status: Never     Passive exposure: Never    Smokeless tobacco: Never   Vaping Use    Vaping Use: Never used   Substance and Sexual Activity    Alcohol use: Defer    Drug use: Defer    Sexual activity: Defer       Immunizations:   Immunization History   Administered Date(s) Administered    COVID-19 (MODERNA) 1st,2nd,3rd Dose Monovalent 2021, 2021, 10/28/2021, 2022    COVID-19 (MODERNA) BIVALENT 12+YRS 2022    COVID-19 F23 (MODERNA) 12YRS+ (SPIKEVAX) 2023    Flu Vaccine Split Quad 2019, 2020, 10/28/2021, 2023    Flublok 18+yrs 2019, 2020, 10/28/2021, 2023    Pneumococcal Conjugate 13-Valent (PCV13) 2018    Pneumococcal Polysaccharide (PPSV23) 2016    Tdap 2018        Medications:     Current Outpatient Medications:     bumetanide (BUMEX) 0.5 MG tablet, Take 1 tablet by mouth Daily As Needed (swelling)., Disp: 15 tablet, Rfl: 1    empagliflozin (Jardiance) 25 MG tablet tablet, Take 1 tablet by mouth Daily., Disp: 90 tablet, Rfl: 1    fluticasone (FLONASE) 50 MCG/ACT nasal  "spray, 1 spray into the nostril(s) as directed by provider Every Night., Disp: 16 g, Rfl: 5    ibuprofen (ADVIL,MOTRIN) 600 MG tablet, Take 1 tablet by mouth Every 6 (Six) Hours As Needed for Mild Pain or Moderate Pain., Disp: 60 tablet, Rfl: 0    losartan (COZAAR) 100 MG tablet, Take 1 tablet by mouth Daily., Disp: 90 tablet, Rfl: 3    meclizine (ANTIVERT) 25 MG tablet, Take 1 tablet by mouth 3 (Three) Times a Day As Needed for Dizziness., Disp: 60 tablet, Rfl: 1    omeprazole (priLOSEC) 40 MG capsule, Take 1 capsule by mouth Daily., Disp: 90 capsule, Rfl: 3    promethazine (PHENERGAN) 25 MG tablet, , Disp: , Rfl:     vitamin D (ERGOCALCIFEROL) 1.25 MG (40064 UT) capsule capsule, Take 1 capsule by mouth 2 (Two) Times a Week. Take 1 capsule twice a week, Disp: 180 capsule, Rfl: 2    Allergies:   Allergies   Allergen Reactions    Cephalosporins Provider Review Needed    Codeine Provider Review Needed    Hydrocodone Provider Review Needed    Penicillin G Pot [Penicillin G] Provider Review Needed    Ranitidine Provider Review Needed       Objective     Vital Signs  /70   Pulse 62   Ht 167.6 cm (65.98\")   Wt 117 kg (257 lb)   SpO2 98%   BMI 41.51 kg/m²   Estimated body mass index is 41.51 kg/m² as calculated from the following:    Height as of this encounter: 167.6 cm (65.98\").    Weight as of this encounter: 117 kg (257 lb).          Physical Exam  Constitutional:       General: She is not in acute distress.     Appearance: Normal appearance. She is obese. She is not toxic-appearing or diaphoretic.   HENT:      Head: Normocephalic and atraumatic.      Right Ear: External ear normal.      Left Ear: External ear normal.      Nose: Nose normal.   Eyes:      Pupils: Pupils are equal, round, and reactive to light.   Musculoskeletal:         General: Tenderness and signs of injury present.   Skin:     General: Skin is warm and dry.   Neurological:      Mental Status: She is alert.   Psychiatric:         Mood and " Affect: Mood normal.         Behavior: Behavior normal.          Arthrocentesis    Date/Time: 11/28/2023 9:16 AM    Performed by: Suman Arroyo MD  Authorized by: Suman Arroyo MD  Indications: pain   Body area: knee  Joint: right knee  Local anesthesia used: no    Anesthesia:  Local anesthesia used: no    Sedation:  Patient sedated: no    Needle size: 22 G  Ultrasound guidance: no  Approach: medial  Meds administered: 1 mL lidocaine 1 %; 40 mg triamcinolone acetonide 40 MG/ML           Assessment and Plan     1. Chondromalacia  She tolerated the procedure well 1 cc of Xylocaine and 1 cc of triamcinolone    2. Injury of right knee, subsequent encounter         Follow Up  No follow-ups on file.    Suman MASTERSONE [facility]  [address] [place] [id number] [phone]

## 2023-12-08 ENCOUNTER — LAB (OUTPATIENT)
Dept: FAMILY MEDICINE CLINIC | Facility: CLINIC | Age: 63
End: 2023-12-08
Payer: MEDICARE

## 2023-12-09 LAB
25(OH)D3+25(OH)D2 SERPL-MCNC: 15.7 NG/ML (ref 30–100)
HBA1C MFR BLD: 6.7 % (ref 4.8–5.6)

## 2023-12-13 ENCOUNTER — TELEPHONE (OUTPATIENT)
Dept: FAMILY MEDICINE CLINIC | Facility: CLINIC | Age: 63
End: 2023-12-13
Payer: MEDICARE

## 2023-12-13 NOTE — TELEPHONE ENCOUNTER
Name:     Kandy Stallings       Relationship:     Best Callback Number:     [phone]        HUB PROVIDED THE RELAY MESSAGE FROM THE OFFICE   PATIENT VOICED UNDERSTANDING AND HAS NO FURTHER QUESTIONS AT THIS TIME

## 2023-12-13 NOTE — TELEPHONE ENCOUNTER
HUB TO RELAY: LVM for pt to call back----- Message from Suman Arroyo MD sent at 12/11/2023  6:31 PM EST -----  3 days ago the hemoglobin A1c was 6.7% which means you are averaging 140  Vitamin D was low at 15.7 ().  Bring your pills in here with you

## 2023-12-19 ENCOUNTER — OFFICE VISIT (OUTPATIENT)
Dept: FAMILY MEDICINE CLINIC | Facility: CLINIC | Age: 63
End: 2023-12-19
Payer: MEDICARE

## 2023-12-19 VITALS
DIASTOLIC BLOOD PRESSURE: 82 MMHG | HEIGHT: 66 IN | BODY MASS INDEX: 40.66 KG/M2 | WEIGHT: 253 LBS | SYSTOLIC BLOOD PRESSURE: 140 MMHG | OXYGEN SATURATION: 97 % | HEART RATE: 81 BPM

## 2023-12-19 DIAGNOSIS — J06.9 UPPER RESPIRATORY TRACT INFECTION, UNSPECIFIED TYPE: ICD-10-CM

## 2023-12-19 DIAGNOSIS — S89.91XD INJURY OF RIGHT KNEE, SUBSEQUENT ENCOUNTER: Primary | ICD-10-CM

## 2023-12-19 NOTE — PROGRESS NOTES
Office Note     Name: Kandy Stallings    : 1960     MRN: [id number]     Chief Complaint  Follow-up (Knee pain)    Subjective     History of Present Illness:  Kandy Stallings is a 63 y.o. female who presents today for knee pain.  You are to go over the results of the MRI which showed linear tear anterior horn of the lateral meniscus with intrasubstance degeneration posterior horn complex tear of the posterior horn medial meniscus and multiple osteochondral defects of the patellar cartilage    She is here to tell of her symptoms cough and blowing green stuff up and feels better now well and look at her but she do not need antibiotics    Review of Systems:   Review of Systems    Past Medical History:   Past Medical History:   Diagnosis Date    Bell's palsy 2006    Chondromalacia 2023    arthoscopy    Environmental allergies     completed allergy shots    Tonsillitis        Past Surgical History:   Past Surgical History:   Procedure Laterality Date    CHOLECYSTECTOMY      KNEE SURGERY      TONSILLECTOMY         Family History: History reviewed. No pertinent family history.    Social History:   Social History     Socioeconomic History    Marital status: Single   Tobacco Use    Smoking status: Never     Passive exposure: Never    Smokeless tobacco: Never   Vaping Use    Vaping Use: Never used   Substance and Sexual Activity    Alcohol use: Defer    Drug use: Defer    Sexual activity: Defer       Immunizations:   Immunization History   Administered Date(s) Administered    COVID-19 (MODERNA) 1st,2nd,3rd Dose Monovalent 2021, 2021, 10/28/2021, 2022    COVID-19 (MODERNA) BIVALENT 12+YRS 2022    COVID-19 F23 (MODERNA) 12YRS+ (SPIKEVAX) 2023    Flu Vaccine Split Quad 2019, 2020, 10/28/2021, 2023    Flublok 18+yrs 2019, 2020, 10/28/2021, 2023, 2023    Pneumococcal Conjugate 13-Valent (PCV13) 2018    Pneumococcal Polysaccharide  "(PPSV23) 06/14/2016    Tdap 07/29/2018        Medications:     Current Outpatient Medications:     bumetanide (BUMEX) 0.5 MG tablet, Take 1 tablet by mouth Daily As Needed (swelling)., Disp: 15 tablet, Rfl: 1    empagliflozin (Jardiance) 25 MG tablet tablet, Take 1 tablet by mouth Daily., Disp: 90 tablet, Rfl: 1    fluticasone (FLONASE) 50 MCG/ACT nasal spray, 1 spray into the nostril(s) as directed by provider Every Night., Disp: 16 g, Rfl: 5    ibuprofen (ADVIL,MOTRIN) 600 MG tablet, Take 1 tablet by mouth Every 6 (Six) Hours As Needed for Mild Pain or Moderate Pain., Disp: 60 tablet, Rfl: 0    losartan (COZAAR) 100 MG tablet, Take 1 tablet by mouth Daily., Disp: 90 tablet, Rfl: 3    meclizine (ANTIVERT) 25 MG tablet, Take 1 tablet by mouth 3 (Three) Times a Day As Needed for Dizziness., Disp: 60 tablet, Rfl: 1    omeprazole (priLOSEC) 40 MG capsule, Take 1 capsule by mouth Daily., Disp: 90 capsule, Rfl: 3    promethazine (PHENERGAN) 25 MG tablet, , Disp: , Rfl:     vitamin D (ERGOCALCIFEROL) 1.25 MG (15499 UT) capsule capsule, Take 1 capsule by mouth 2 (Two) Times a Week. Take 1 capsule twice a week, Disp: 180 capsule, Rfl: 2    Allergies:   Allergies   Allergen Reactions    Cephalosporins Provider Review Needed    Codeine Provider Review Needed    Hydrocodone Provider Review Needed    Penicillin G Pot [Penicillin G] Provider Review Needed    Ranitidine Provider Review Needed       Objective     Vital Signs  /82   Pulse 81   Ht 167.6 cm (65.98\")   Wt 115 kg (253 lb)   SpO2 97%   BMI 40.86 kg/m²   Estimated body mass index is 40.86 kg/m² as calculated from the following:    Height as of this encounter: 167.6 cm (65.98\").    Weight as of this encounter: 115 kg (253 lb).          Physical Exam  Vitals and nursing note reviewed.   HENT:      Head: Normocephalic and atraumatic.      Comments: Both tm;s scarred 2+     Right Ear: Ear canal and external ear normal.      Left Ear: Ear canal and external ear " normal.      Nose: No congestion or rhinorrhea.      Mouth/Throat:      Mouth: Mucous membranes are moist. Mucous membranes are dry.   Eyes:      Extraocular Movements: Extraocular movements intact.      Conjunctiva/sclera: Conjunctivae normal.      Pupils: Pupils are equal, round, and reactive to light.   Cardiovascular:      Rate and Rhythm: Normal rate and regular rhythm.   Pulmonary:      Effort: Pulmonary effort is normal.      Breath sounds: Normal breath sounds.   Lymphadenopathy:      Cervical: No cervical adenopathy.   Skin:     General: Skin is warm and dry.   Neurological:      General: No focal deficit present.      Mental Status: She is alert.          Procedures     Assessment and Plan     1. Injury of right knee, subsequent encounter  Refer to Armen Starr  - Ambulatory Referral to Orthopedic Surgery    2. Upper respiratory tract infection, unspecified type  Unspecified       Follow Up  Return if symptoms worsen or fail to improve.    Suman DONAHUE [facility]  [address] [place] [id number] [phone]

## 2024-01-12 ENCOUNTER — OFFICE VISIT (OUTPATIENT)
Age: 64
End: 2024-01-12
Payer: MEDICARE

## 2024-01-12 VITALS
BODY MASS INDEX: 39.87 KG/M2 | WEIGHT: 248.1 LBS | HEIGHT: 66 IN | DIASTOLIC BLOOD PRESSURE: 86 MMHG | SYSTOLIC BLOOD PRESSURE: 158 MMHG

## 2024-01-12 DIAGNOSIS — M23.241 DERANGEMENT OF ANTERIOR HORN OF LATERAL MENISCUS DUE TO OLD TEAR OR INJURY, RIGHT KNEE: ICD-10-CM

## 2024-01-12 DIAGNOSIS — S83.231A COMPLEX TEAR OF MEDIAL MENISCUS OF RIGHT KNEE AS CURRENT INJURY, INITIAL ENCOUNTER: ICD-10-CM

## 2024-01-12 DIAGNOSIS — M17.11 PRIMARY OSTEOARTHRITIS OF RIGHT KNEE: Primary | ICD-10-CM

## 2024-01-12 NOTE — PROGRESS NOTES
[facility] [place] [facility]opa[facility] Surgery Office Visit     Office Visit       Date: 01/12/2024   Patient Name: Kandy Stallings  MRN: [id number]  YOB: 1960    Referring Physician: Suman Arroyo MD     Chief Complaint:   Chief Complaint   Patient presents with    Right Knee - Pain       History of Present Illness:   Kandy Stallings is a 63 y.o. female who presents with right knee pain for 1 year(s). Onset atraumatic and gradual in nature. Pain is localized to the anterior knee and is a 0/10 on the pain scale currently. Pain is described as aching. Associated symptoms include swelling. The pain is worse with walking, standing, and sitting; cortisone injection make it better. Previous treatments have included: NSAIDS and steroid injection (last injection 11/28/23) since symptom onset. Although some transient relief was reported with these interventions, these conservative measures have failed and symptoms have persisted. The patient is limited in daily activities and has had a significant decrease in quality of life as a result. She denies fevers, chills, or constitutional symptoms.    Subjective   Review of Systems: Review of Systems   Constitutional:  Negative for chills, fever, unexpected weight gain and unexpected weight loss.   HENT:  Negative for congestion, postnasal drip and rhinorrhea.    Eyes:  Negative for blurred vision.   Respiratory:  Negative for shortness of breath.    Cardiovascular:  Negative for leg swelling.   Gastrointestinal:  Negative for abdominal pain, nausea and vomiting.   Genitourinary:  Negative for difficulty urinating.   Musculoskeletal:  Positive for arthralgias. Negative for gait problem, joint swelling and myalgias.   Skin:  Negative for skin lesions and wound.   Neurological:  Negative for dizziness, weakness, light-headedness and numbness.   Hematological:  Does not bruise/bleed easily.   Psychiatric/Behavioral:  Negative for depressed mood.          I have reviewed the following portions of the patient's history:History of Present Illness and review of systems.    Past Medical History:   Past Medical History:   Diagnosis Date    Bell's palsy 2006    Chondromalacia 11/06/2023    arthoscopy    Environmental allergies     completed allergy shots    Tear of meniscus of knee     left knee  from playing basketball in high school    Tonsillitis        Past Surgical History:   Past Surgical History:   Procedure Laterality Date    CHOLECYSTECTOMY      KNEE SURGERY      left x2    TONSILLECTOMY         Family History: History reviewed. No pertinent family history.    Social History:   Social History     Socioeconomic History    Marital status: Single   Tobacco Use    Smoking status: Never     Passive exposure: Never    Smokeless tobacco: Never   Vaping Use    Vaping Use: Never used   Substance and Sexual Activity    Alcohol use: Never    Drug use: Never    Sexual activity: Never       Medications:   Current Outpatient Medications:     empagliflozin (Jardiance) 25 MG tablet tablet, Take 1 tablet by mouth Daily., Disp: 90 tablet, Rfl: 1    fluticasone (FLONASE) 50 MCG/ACT nasal spray, 1 spray into the nostril(s) as directed by provider Every Night., Disp: 16 g, Rfl: 5    ibuprofen (ADVIL,MOTRIN) 600 MG tablet, Take 1 tablet by mouth Every 6 (Six) Hours As Needed for Mild Pain or Moderate Pain., Disp: 60 tablet, Rfl: 0    losartan (COZAAR) 100 MG tablet, Take 1 tablet by mouth Daily., Disp: 90 tablet, Rfl: 3    meclizine (ANTIVERT) 25 MG tablet, Take 1 tablet by mouth 3 (Three) Times a Day As Needed for Dizziness., Disp: 60 tablet, Rfl: 1    omeprazole (priLOSEC) 40 MG capsule, Take 1 capsule by mouth Daily., Disp: 90 capsule, Rfl: 3    vitamin D (ERGOCALCIFEROL) 1.25 MG (33911 UT) capsule capsule, Take 1 capsule by mouth 2 (Two) Times a Week. Take 1 capsule twice a week, Disp: 180 capsule, Rfl: 2    promethazine (PHENERGAN) 25 MG tablet, , Disp: , Rfl:     Allergies:  "  Allergies   Allergen Reactions    Cephalosporins Provider Review Needed    Codeine Provider Review Needed    Hydrocodone Provider Review Needed    Penicillin G Pot [Penicillin G] Provider Review Needed    Ranitidine Provider Review Needed       I reviewed the patient's chief complaint, history of present illness, review of systems, past medical history, surgical history, family history, social history, medications and allergy list.     Objective    Vital Signs:   Vitals:    01/12/24 1124   BP: 158/86   Weight: 113 kg (248 lb 1.6 oz)   Height: 166.4 cm (65.5\")     Body mass index is 40.66 kg/m².   Class 3 Severe Obesity (BMI >=40). Obesity-related health conditions include the following: hypertension, coronary heart disease, diabetes mellitus, and dyslipidemias. Obesity is newly identified. BMI is is above average; BMI management plan is completed. We discussed portion control and increasing exercise.     Patient reports that she is a non-smoker and has not ever been a smoker.  This behavior was applauded and she was encouraged to continue in smoking cessation.  We will continue to monitor at subsequent visits.    Ortho Exam:  Constitutional: General Appearance: healthy-appearing, NAD, and normal body habitus.   Gait and Station: Appearance: limp and antalgic gait and ambulates with no assitive devices.   Skin: Right Lower Extremity: normal. Left Lower Extremity: normal.   Psychiatric: Orientation: oriented to time, place, and person. Mood and Affect: normal mood and affect and active and alert.   Right knee exam:   There is swelling and effusion but no warmth or erythema of the knee.    The skin is clear.    Overall the alignment of the knee is varus   The patient has 5/5 strength with ankle plantar flexion, dorsiflexion, inversion, eversion, and great toe extension.    Sensation is grossly present to light touch in the superficial peroneal, deep peroneal, and tibial nerve distributions.    The dorsalis pedis " pulse is 2+ and the foot is well perfused with brisk capillary refill.   Active ROM is 0° to 110°.   Passive ROM is 0° to 110°.   The knee shows no gross instability to varus/valgus stress testing, anterior/posterior drawer sign, and Lachman testing.    There is tenderness to palpation over the medial/lateral joint line. Patellar grind test is positive.   Hip ROM is full and painless.  Left knee exam:   Normal knee contour.   No evidence of swelling or effusion. Full range of motion.    Results Review:   Imaging Results (Last 24 Hours)       ** No results found for the last 24 hours. **        I personally reviewed and interpreted the radiographs of the right knee from 10/27/2023.  No acute fracture or dislocation.  Tricompartmental degenerative changes are noted most severe in the patellofemoral joint space.  There is also chondrocalcinosis of both the medial and lateral menisci.    Procedures    Assessment / Plan    Assessment/Plan:   Diagnoses and all orders for this visit:    1. Primary osteoarthritis of right knee (Primary)  -     Large Joint Arthrocentesis; Future    2. Derangement of anterior horn of lateral meniscus due to old tear or injury, right knee    3. Complex tear of medial meniscus of right knee as current injury, initial encounter    Right knee pain ongoing for the last 1 year.  Symptoms initially began in the anterior knee but have now moved more globally.  She denies any mechanism of injury.  No previous history of right knee surgery.  Radiographs reveal tricompartmental degenerative changes in the knee most prominently in the patellofemoral joint space.  I explained to her how this likely relates to most pain in the anterior knee as well as with flexion extension movements of the knee.  MRI of the right knee previously obtained shows a tear of the anterior horn of the lateral meniscus with degeneration of the posterior horn.  There is also a complex tear of the posterior horn of the medial  meniscus.  There is also an element of deposition arthropathy.  Previous treatment has included a corticosteroid injection into the knee on 11/27/2023.  She is also been on Tylenol, ibuprofen, and Bumex to help with swelling.  The steroid injection provided great relief of symptoms though she does have intermittent swelling.  She is starting to notice increased pain over the last few days in the knee.  Therefore, I recommended that we order viscosupplementation injections to start in 1 month's time.  Recommend continuing with the ibuprofen and Tylenol but only as needed.  I will see her back in 1 month to begin that series of injections.    Previous imaging studies reviewed: 10/27/2023-radiographs of the right knee.  12/14/2023-MRI right knee.    Previous documentation reviewed: 11/28/2023-office visit-Suman Arroyo MD.    Follow Up:   Return in about 1 month (around 2/12/2024) for Recheck.      Antonio Starr MD  [facility] [place] [facility]

## 2024-02-16 ENCOUNTER — CLINICAL SUPPORT (OUTPATIENT)
Age: 64
End: 2024-02-16
Payer: MEDICARE

## 2024-02-16 DIAGNOSIS — M17.11 PRIMARY OSTEOARTHRITIS OF RIGHT KNEE: Primary | ICD-10-CM

## 2024-02-16 RX ORDER — LIDOCAINE HYDROCHLORIDE 10 MG/ML
4 INJECTION, SOLUTION EPIDURAL; INFILTRATION; INTRACAUDAL; PERINEURAL
Status: COMPLETED | OUTPATIENT
Start: 2024-02-16 | End: 2024-02-16

## 2024-02-16 RX ADMIN — LIDOCAINE HYDROCHLORIDE 4 ML: 10 INJECTION, SOLUTION EPIDURAL; INFILTRATION; INTRACAUDAL; PERINEURAL at 10:03

## 2024-02-23 ENCOUNTER — CLINICAL SUPPORT (OUTPATIENT)
Age: 64
End: 2024-02-23
Payer: MEDICARE

## 2024-02-23 DIAGNOSIS — M17.11 PRIMARY OSTEOARTHRITIS OF RIGHT KNEE: Primary | ICD-10-CM

## 2024-02-23 RX ORDER — LIDOCAINE HYDROCHLORIDE 10 MG/ML
4 INJECTION, SOLUTION EPIDURAL; INFILTRATION; INTRACAUDAL; PERINEURAL
Status: COMPLETED | OUTPATIENT
Start: 2024-02-23 | End: 2024-02-23

## 2024-02-23 RX ADMIN — LIDOCAINE HYDROCHLORIDE 4 ML: 10 INJECTION, SOLUTION EPIDURAL; INFILTRATION; INTRACAUDAL; PERINEURAL at 10:23

## 2024-02-23 NOTE — PROGRESS NOTES
Procedure   - Large Joint Arthrocentesis: R knee on 2/23/2024 10:23 AM  Indications: pain  Details: 21 G needle, ultrasound-guided superolateral approach  Medications: 30 mg Hyaluronan 30 MG/2ML; 4 mL lidocaine PF 1% 1 %  Outcome: tolerated well, no immediate complications  Procedure, treatment alternatives, risks and benefits explained, specific risks discussed. Consent was given by the patient. Immediately prior to procedure a time out was called to verify the correct patient, procedure, equipment, support staff and site/side marked as required. Patient was prepped and draped in the usual sterile fashion.        63-year-old female with right knee pain from right knee osteoarthritis.  Here today for ultrasound-guided Orthovisc injection #2 of a 3 part series.  Ultrasound guidance used for proper needle placement.  Procedure was explained in detail prior to starting.  All questions answered to the best of my ability.  Procedure was completed without complication.  See procedure note above.  She will follow-up in 1 week for injection #3.

## 2024-03-01 ENCOUNTER — CLINICAL SUPPORT (OUTPATIENT)
Age: 64
End: 2024-03-01
Payer: MEDICARE

## 2024-03-01 DIAGNOSIS — M17.11 PRIMARY OSTEOARTHRITIS OF RIGHT KNEE: Primary | ICD-10-CM

## 2024-03-01 RX ORDER — LIDOCAINE HYDROCHLORIDE 10 MG/ML
4 INJECTION, SOLUTION EPIDURAL; INFILTRATION; INTRACAUDAL; PERINEURAL
Status: COMPLETED | OUTPATIENT
Start: 2024-03-01 | End: 2024-03-01

## 2024-03-01 RX ADMIN — LIDOCAINE HYDROCHLORIDE 4 ML: 10 INJECTION, SOLUTION EPIDURAL; INFILTRATION; INTRACAUDAL; PERINEURAL at 10:19

## 2024-03-01 NOTE — PROGRESS NOTES
Procedure   - Large Joint Arthrocentesis: R knee on 3/1/2024 10:19 AM  Indications: pain  Details: 21 G needle, ultrasound-guided superolateral approach  Medications: 30 mg Hyaluronan 30 MG/2ML; 4 mL lidocaine PF 1% 1 %  Outcome: tolerated well, no immediate complications  Procedure, treatment alternatives, risks and benefits explained, specific risks discussed. Consent was given by the patient. Immediately prior to procedure a time out was called to verify the correct patient, procedure, equipment, support staff and site/side marked as required. Patient was prepped and draped in the usual sterile fashion.        63-year-old female with right knee pain from right knee osteoarthritis.  Here today for ultrasound-guided Orthovisc injection #3 of a 3 part series.  Ultrasound guidance used for proper needle placement.  Procedure was explained in detail prior to starting.  All questions answered to the best of my ability.  Procedure was completed without complication.  See procedure note above.  She will follow-up in 1 week as symptoms dictate.

## 2024-03-15 ENCOUNTER — OFFICE VISIT (OUTPATIENT)
Dept: FAMILY MEDICINE CLINIC | Facility: CLINIC | Age: 64
End: 2024-03-15
Payer: MEDICARE

## 2024-03-15 VITALS
HEIGHT: 66 IN | OXYGEN SATURATION: 99 % | DIASTOLIC BLOOD PRESSURE: 82 MMHG | HEART RATE: 57 BPM | SYSTOLIC BLOOD PRESSURE: 140 MMHG | BODY MASS INDEX: 40.66 KG/M2 | WEIGHT: 253 LBS

## 2024-03-15 DIAGNOSIS — I10 PRIMARY HYPERTENSION: ICD-10-CM

## 2024-03-15 DIAGNOSIS — R30.0 DYSURIA: Primary | ICD-10-CM

## 2024-03-15 DIAGNOSIS — E11.65 TYPE 2 DIABETES MELLITUS WITH HYPERGLYCEMIA, WITHOUT LONG-TERM CURRENT USE OF INSULIN: ICD-10-CM

## 2024-03-15 LAB
BILIRUB BLD-MCNC: NEGATIVE MG/DL
CLARITY, POC: CLEAR
COLOR UR: YELLOW
EXPIRATION DATE: NORMAL
GLUCOSE UR STRIP-MCNC: NEGATIVE MG/DL
KETONES UR QL: NEGATIVE
LEUKOCYTE EST, POC: NEGATIVE
Lab: NORMAL
NITRITE UR-MCNC: NEGATIVE MG/ML
PH UR: 6 [PH] (ref 5–8)
PROT UR STRIP-MCNC: NEGATIVE MG/DL
RBC # UR STRIP: NEGATIVE /UL
SP GR UR: 1.03 (ref 1–1.03)
UROBILINOGEN UR QL: NORMAL

## 2024-03-15 PROCEDURE — 1159F MED LIST DOCD IN RCRD: CPT | Performed by: FAMILY MEDICINE

## 2024-03-15 PROCEDURE — 3079F DIAST BP 80-89 MM HG: CPT | Performed by: FAMILY MEDICINE

## 2024-03-15 PROCEDURE — 1160F RVW MEDS BY RX/DR IN RCRD: CPT | Performed by: FAMILY MEDICINE

## 2024-03-15 PROCEDURE — 3077F SYST BP >= 140 MM HG: CPT | Performed by: FAMILY MEDICINE

## 2024-03-15 PROCEDURE — 81003 URINALYSIS AUTO W/O SCOPE: CPT | Performed by: FAMILY MEDICINE

## 2024-03-15 PROCEDURE — 99213 OFFICE O/P EST LOW 20 MIN: CPT | Performed by: FAMILY MEDICINE

## 2024-03-15 NOTE — PROGRESS NOTES
Office Note     Name: Kandy Stallings    : 1960     MRN: [id number]     Chief Complaint  med check (Follow up) and Urinary Tract Infection    Subjective     History of Present Illness:  Kandy Stallings is a 63 y.o. female who presents today for complaints of dysuria and not ability to pee.  No fever no chills no sweats no runny nose no low back pain    Review of Systems:   Review of Systems    Past Medical History:   Past Medical History:   Diagnosis Date    Bell's palsy 2006    Chondromalacia 2023    arthoscopy    Environmental allergies     completed allergy shots    Tear of meniscus of knee     left knee  from playing basketball in high school    Tonsillitis        Past Surgical History:   Past Surgical History:   Procedure Laterality Date    CHOLECYSTECTOMY      KNEE SURGERY      left x2    TONSILLECTOMY         Family History: History reviewed. No pertinent family history.    Social History:   Social History     Socioeconomic History    Marital status: Single   Tobacco Use    Smoking status: Never     Passive exposure: Never    Smokeless tobacco: Never   Vaping Use    Vaping status: Never Used   Substance and Sexual Activity    Alcohol use: Never    Drug use: Never    Sexual activity: Never       Immunizations:   Immunization History   Administered Date(s) Administered    COVID-19 (MODERNA) 1st,2nd,3rd Dose Monovalent 2021, 2021, 10/28/2021, 2022    COVID-19 (MODERNA) BIVALENT 12+YRS 2022    COVID-19 F23 (MODERNA) 12YRS+ (SPIKEVAX) 2023    Flu Vaccine Split Quad 2019, 2020, 10/28/2021, 2023    Flublok 18+yrs 2019, 2020, 10/28/2021, 2023, 2023    Pneumococcal Conjugate 13-Valent (PCV13) 2018    Pneumococcal Polysaccharide (PPSV23) 2016    Tdap 2018        Medications:     Current Outpatient Medications:     empagliflozin (Jardiance) 25 MG tablet tablet, Take 1 tablet by mouth Daily., Disp: 90 tablet,  "Rfl: 1    fluticasone (FLONASE) 50 MCG/ACT nasal spray, 1 spray into the nostril(s) as directed by provider Every Night., Disp: 16 g, Rfl: 5    ibuprofen (ADVIL,MOTRIN) 600 MG tablet, Take 1 tablet by mouth Every 6 (Six) Hours As Needed for Mild Pain or Moderate Pain., Disp: 60 tablet, Rfl: 0    losartan (COZAAR) 100 MG tablet, Take 1 tablet by mouth Daily., Disp: 90 tablet, Rfl: 3    meclizine (ANTIVERT) 25 MG tablet, Take 1 tablet by mouth 3 (Three) Times a Day As Needed for Dizziness., Disp: 60 tablet, Rfl: 1    omeprazole (priLOSEC) 40 MG capsule, Take 1 capsule by mouth Daily., Disp: 90 capsule, Rfl: 3    promethazine (PHENERGAN) 25 MG tablet, , Disp: , Rfl:     vitamin D (ERGOCALCIFEROL) 1.25 MG (07393 UT) capsule capsule, Take 1 capsule by mouth 2 (Two) Times a Week. Take 1 capsule twice a week, Disp: 180 capsule, Rfl: 2    Allergies:   Allergies   Allergen Reactions    Cephalosporins Provider Review Needed    Codeine Provider Review Needed    Hydrocodone Provider Review Needed    Penicillin G Pot [Penicillin G] Provider Review Needed    Ranitidine Provider Review Needed       Objective     Vital Signs  /82   Pulse 57   Ht 166.4 cm (65.5\")   Wt 115 kg (253 lb)   SpO2 99%   BMI 41.46 kg/m²   Estimated body mass index is 41.46 kg/m² as calculated from the following:    Height as of this encounter: 166.4 cm (65.5\").    Weight as of this encounter: 115 kg (253 lb).            Physical Exam  Constitutional:       General: She is not in acute distress.     Appearance: Normal appearance. She is obese. She is not toxic-appearing or diaphoretic.   HENT:      Head: Normocephalic and atraumatic.      Right Ear: External ear normal.      Left Ear: External ear normal.      Nose: Nose normal.   Eyes:      Pupils: Pupils are equal, round, and reactive to light.   Skin:     General: Skin is warm and dry.   Neurological:      Mental Status: She is alert.   Psychiatric:         Mood and Affect: Mood normal.         " Behavior: Behavior normal.          Procedures     Assessment and Plan     1. Dysuria  Been ruled out by the UA, specific gravity 1.030  - POC Urinalysis Dipstick, Automated    2. Type 2 diabetes mellitus with hyperglycemia, without long-term current use of insulin      3. Primary hypertension  Drink more water       Follow Up  Return in about 6 months (around 9/15/2024).    Suman DONAHUE [facility]  [address] [place] [id number] [phone]

## 2024-03-22 ENCOUNTER — TELEPHONE (OUTPATIENT)
Dept: FAMILY MEDICINE CLINIC | Facility: CLINIC | Age: 64
End: 2024-03-22
Payer: MEDICARE

## 2024-04-12 ENCOUNTER — OFFICE VISIT (OUTPATIENT)
Dept: FAMILY MEDICINE CLINIC | Facility: CLINIC | Age: 64
End: 2024-04-12
Payer: MEDICARE

## 2024-04-12 VITALS
BODY MASS INDEX: 41.62 KG/M2 | HEART RATE: 69 BPM | SYSTOLIC BLOOD PRESSURE: 142 MMHG | HEIGHT: 66 IN | WEIGHT: 259 LBS | DIASTOLIC BLOOD PRESSURE: 70 MMHG | OXYGEN SATURATION: 99 %

## 2024-04-12 DIAGNOSIS — R60.1 GENERALIZED EDEMA: Primary | ICD-10-CM

## 2024-04-12 PROCEDURE — 3077F SYST BP >= 140 MM HG: CPT | Performed by: FAMILY MEDICINE

## 2024-04-12 PROCEDURE — 3078F DIAST BP <80 MM HG: CPT | Performed by: FAMILY MEDICINE

## 2024-04-12 PROCEDURE — 99214 OFFICE O/P EST MOD 30 MIN: CPT | Performed by: FAMILY MEDICINE

## 2024-04-12 RX ORDER — POTASSIUM CHLORIDE 750 MG/1
10 TABLET, EXTENDED RELEASE ORAL DAILY
Qty: 30 TABLET | Refills: 1 | Status: SHIPPED | OUTPATIENT
Start: 2024-04-12

## 2024-04-12 RX ORDER — BUMETANIDE 1 MG/1
1 TABLET ORAL DAILY
Qty: 30 TABLET | Refills: 1 | Status: SHIPPED | OUTPATIENT
Start: 2024-04-12

## 2024-04-12 NOTE — PROGRESS NOTES
Office Note     Name: Kandy Stallings    : 1960     MRN: [id number]     Chief Complaint  Foot Swelling (Both feet. X7 days./)    Subjective     History of Present Illness:  Kandy Stallings is a 64 y.o. female who presents today for both feet swelling and for 7 days, hypertension, vitamin D deficiency, irritable bowel syndrome with diarrhea.  She is got his right knee injections, gel like, who have got her knees feeling better.  Right leg a little more swollen left.  No shortness of breath, dyspnea on exertion, fatigue.    Review of Systems:   Review of Systems    Past Medical History:   Past Medical History:   Diagnosis Date    Bell's palsy 2006    Chondromalacia 2023    arthoscopy    Environmental allergies     completed allergy shots    Tear of meniscus of knee     left knee  from playing basketball in high school    Tonsillitis        Past Surgical History:   Past Surgical History:   Procedure Laterality Date    CHOLECYSTECTOMY      KNEE SURGERY      left x2    TONSILLECTOMY         Family History: History reviewed. No pertinent family history.    Social History:   Social History     Socioeconomic History    Marital status: Single   Tobacco Use    Smoking status: Never     Passive exposure: Never    Smokeless tobacco: Never   Vaping Use    Vaping status: Never Used   Substance and Sexual Activity    Alcohol use: Never    Drug use: Never    Sexual activity: Never       Immunizations:   Immunization History   Administered Date(s) Administered    COVID-19 (MODERNA) 1st,2nd,3rd Dose Monovalent 2021, 2021, 10/28/2021, 2022    COVID-19 (MODERNA) BIVALENT 12+YRS 2022    COVID-19 F23 (MODERNA) 12YRS+ (SPIKEVAX) 2023    Flu Vaccine Split Quad 2019, 2020, 10/28/2021, 2023    Flublok 18+yrs 2019, 2020, 10/28/2021, 2023, 2023    Pneumococcal Conjugate 13-Valent (PCV13) 2018    Pneumococcal Polysaccharide (PPSV23) 2016     "Tdap 07/29/2018        Medications:     Current Outpatient Medications:     empagliflozin (Jardiance) 25 MG tablet tablet, Take 1 tablet by mouth Daily., Disp: 90 tablet, Rfl: 1    fluticasone (FLONASE) 50 MCG/ACT nasal spray, 1 spray into the nostril(s) as directed by provider Every Night., Disp: 16 g, Rfl: 5    ibuprofen (ADVIL,MOTRIN) 600 MG tablet, Take 1 tablet by mouth Every 6 (Six) Hours As Needed for Mild Pain or Moderate Pain., Disp: 60 tablet, Rfl: 0    losartan (COZAAR) 100 MG tablet, Take 1 tablet by mouth Daily., Disp: 90 tablet, Rfl: 3    meclizine (ANTIVERT) 25 MG tablet, Take 1 tablet by mouth 3 (Three) Times a Day As Needed for Dizziness., Disp: 60 tablet, Rfl: 1    omeprazole (priLOSEC) 40 MG capsule, Take 1 capsule by mouth Daily., Disp: 90 capsule, Rfl: 3    promethazine (PHENERGAN) 25 MG tablet, , Disp: , Rfl:     vitamin D (ERGOCALCIFEROL) 1.25 MG (27408 UT) capsule capsule, Take 1 capsule by mouth 2 (Two) Times a Week. Take 1 capsule twice a week, Disp: 180 capsule, Rfl: 2    bumetanide (BUMEX) 1 MG tablet, Take 1 tablet by mouth Daily., Disp: 30 tablet, Rfl: 1    potassium chloride (KLOR-CON M10) 10 MEQ CR tablet, Take 1 tablet by mouth Daily., Disp: 30 tablet, Rfl: 1    Allergies:   Allergies   Allergen Reactions    Cephalosporins Provider Review Needed    Codeine Provider Review Needed    Hydrocodone Provider Review Needed    Penicillin G Pot [Penicillin G] Provider Review Needed    Ranitidine Provider Review Needed       Objective     Vital Signs  /70   Pulse 69   Ht 166.4 cm (65.5\")   Wt 117 kg (259 lb)   SpO2 99%   BMI 42.44 kg/m²   Estimated body mass index is 42.44 kg/m² as calculated from the following:    Height as of this encounter: 166.4 cm (65.5\").    Weight as of this encounter: 117 kg (259 lb).            Physical Exam  Vitals and nursing note reviewed.   Constitutional:       Appearance: Normal appearance. She is obese.   HENT:      Head: Normocephalic.      Right " Ear: External ear normal.      Left Ear: External ear normal.      Nose: Nose normal.   Eyes:      Pupils: Pupils are equal, round, and reactive to light.   Cardiovascular:      Rate and Rhythm: Normal rate and regular rhythm.      Pulses: Normal pulses.      Heart sounds: Normal heart sounds.   Pulmonary:      Effort: Pulmonary effort is normal.      Breath sounds: Normal breath sounds.   Musculoskeletal:      Cervical back: Normal range of motion and neck supple.      Right lower leg: Edema present.      Left lower leg: Edema present.        Legs:       Comments: 3+ edema, lower to ankles, 1+ alng the pretibial area, bilateral   Skin:     General: Skin is warm and dry.   Neurological:      Mental Status: She is alert.   Psychiatric:         Mood and Affect: Mood normal.          Procedures     Assessment and Plan     1. Generalized edema  Cannot see what it is caused of it but unsure if she needed Bumex and potassium  2. Dm Ii  3. Htn alittle out of control.  Come back in 4 weeeks to do tsh, hgba1c, cmp and mg. Cbc. etc     Follow Up  No follow-ups on file.    Suman DONAHUE [facility]IMARY [facility]  [address] [facility] [id number] [phone]

## 2024-05-14 ENCOUNTER — LAB (OUTPATIENT)
Dept: FAMILY MEDICINE CLINIC | Facility: CLINIC | Age: 64
End: 2024-05-14
Payer: MEDICARE

## 2024-05-14 DIAGNOSIS — E78.2 MIXED HYPERLIPIDEMIA: Primary | ICD-10-CM

## 2024-05-14 DIAGNOSIS — E11.65 TYPE 2 DIABETES MELLITUS WITH HYPERGLYCEMIA, WITHOUT LONG-TERM CURRENT USE OF INSULIN: ICD-10-CM

## 2024-05-14 DIAGNOSIS — E03.9 HYPOTHYROIDISM, UNSPECIFIED TYPE: ICD-10-CM

## 2024-05-14 DIAGNOSIS — E55.9 VITAMIN D DEFICIENCY: ICD-10-CM

## 2024-05-14 DIAGNOSIS — I10 PRIMARY HYPERTENSION: ICD-10-CM

## 2024-05-15 LAB
25(OH)D3+25(OH)D2 SERPL-MCNC: 12.4 NG/ML (ref 30–100)
ALBUMIN SERPL-MCNC: 3.9 G/DL (ref 3.9–4.9)
ALBUMIN/GLOB SERPL: 1.6 {RATIO} (ref 1.2–2.2)
ALP SERPL-CCNC: 82 IU/L (ref 44–121)
ALT SERPL-CCNC: 27 IU/L (ref 0–32)
AST SERPL-CCNC: 20 IU/L (ref 0–40)
BASOPHILS # BLD AUTO: 0.1 X10E3/UL (ref 0–0.2)
BASOPHILS NFR BLD AUTO: 1 %
BILIRUB SERPL-MCNC: 0.5 MG/DL (ref 0–1.2)
BUN SERPL-MCNC: 17 MG/DL (ref 8–27)
BUN/CREAT SERPL: 17 (ref 12–28)
CALCIUM SERPL-MCNC: 9.2 MG/DL (ref 8.7–10.3)
CHLORIDE SERPL-SCNC: 101 MMOL/L (ref 96–106)
CO2 SERPL-SCNC: 25 MMOL/L (ref 20–29)
CREAT SERPL-MCNC: 1.02 MG/DL (ref 0.57–1)
EGFRCR SERPLBLD CKD-EPI 2021: 61 ML/MIN/1.73
EOSINOPHIL # BLD AUTO: 0.3 X10E3/UL (ref 0–0.4)
EOSINOPHIL NFR BLD AUTO: 4 %
ERYTHROCYTE [DISTWIDTH] IN BLOOD BY AUTOMATED COUNT: 12.8 % (ref 11.7–15.4)
GLOBULIN SER CALC-MCNC: 2.4 G/DL (ref 1.5–4.5)
GLUCOSE SERPL-MCNC: 147 MG/DL (ref 70–99)
HBA1C MFR BLD: 6.8 % (ref 4.8–5.6)
HCT VFR BLD AUTO: 45.7 % (ref 34–46.6)
HGB BLD-MCNC: 15 G/DL (ref 11.1–15.9)
IMM GRANULOCYTES # BLD AUTO: 0 X10E3/UL (ref 0–0.1)
IMM GRANULOCYTES NFR BLD AUTO: 1 %
LYMPHOCYTES # BLD AUTO: 2.1 X10E3/UL (ref 0.7–3.1)
LYMPHOCYTES NFR BLD AUTO: 25 %
MCH RBC QN AUTO: 31.2 PG (ref 26.6–33)
MCHC RBC AUTO-ENTMCNC: 32.8 G/DL (ref 31.5–35.7)
MCV RBC AUTO: 95 FL (ref 79–97)
MONOCYTES # BLD AUTO: 0.5 X10E3/UL (ref 0.1–0.9)
MONOCYTES NFR BLD AUTO: 6 %
NEUTROPHILS # BLD AUTO: 5.3 X10E3/UL (ref 1.4–7)
NEUTROPHILS NFR BLD AUTO: 63 %
PLATELET # BLD AUTO: 276 X10E3/UL (ref 150–450)
POTASSIUM SERPL-SCNC: 3.7 MMOL/L (ref 3.5–5.2)
PROT SERPL-MCNC: 6.3 G/DL (ref 6–8.5)
RBC # BLD AUTO: 4.81 X10E6/UL (ref 3.77–5.28)
SODIUM SERPL-SCNC: 139 MMOL/L (ref 134–144)
TSH SERPL DL<=0.005 MIU/L-ACNC: 1.92 UIU/ML (ref 0.45–4.5)
WBC # BLD AUTO: 8.3 X10E3/UL (ref 3.4–10.8)

## 2024-05-16 DIAGNOSIS — E55.9 VITAMIN D DEFICIENCY: ICD-10-CM

## 2024-05-16 RX ORDER — ERGOCALCIFEROL 1.25 MG/1
50000 CAPSULE ORAL 2 TIMES WEEKLY
Qty: 26 CAPSULE | Refills: 1 | Status: SHIPPED | OUTPATIENT
Start: 2024-05-16

## 2024-10-15 ENCOUNTER — OFFICE VISIT (OUTPATIENT)
Dept: FAMILY MEDICINE CLINIC | Facility: CLINIC | Age: 64
End: 2024-10-15
Payer: MEDICARE

## 2024-10-15 VITALS
DIASTOLIC BLOOD PRESSURE: 78 MMHG | SYSTOLIC BLOOD PRESSURE: 126 MMHG | WEIGHT: 252 LBS | HEART RATE: 71 BPM | OXYGEN SATURATION: 97 % | BODY MASS INDEX: 40.5 KG/M2 | HEIGHT: 66 IN

## 2024-10-15 DIAGNOSIS — I10 PRIMARY HYPERTENSION: ICD-10-CM

## 2024-10-15 DIAGNOSIS — L02.92 BOILS: Primary | ICD-10-CM

## 2024-10-15 DIAGNOSIS — E11.65 TYPE 2 DIABETES MELLITUS WITH HYPERGLYCEMIA, WITHOUT LONG-TERM CURRENT USE OF INSULIN: ICD-10-CM

## 2024-10-15 LAB
EXPIRATION DATE: ABNORMAL
HBA1C MFR BLD: 6.6 % (ref 4.5–5.7)
Lab: ABNORMAL

## 2024-10-15 PROCEDURE — 83036 HEMOGLOBIN GLYCOSYLATED A1C: CPT | Performed by: FAMILY MEDICINE

## 2024-10-15 PROCEDURE — 3078F DIAST BP <80 MM HG: CPT | Performed by: FAMILY MEDICINE

## 2024-10-15 PROCEDURE — 1125F AMNT PAIN NOTED PAIN PRSNT: CPT | Performed by: FAMILY MEDICINE

## 2024-10-15 PROCEDURE — 3044F HG A1C LEVEL LT 7.0%: CPT | Performed by: FAMILY MEDICINE

## 2024-10-15 PROCEDURE — 1159F MED LIST DOCD IN RCRD: CPT | Performed by: FAMILY MEDICINE

## 2024-10-15 PROCEDURE — 3074F SYST BP LT 130 MM HG: CPT | Performed by: FAMILY MEDICINE

## 2024-10-15 PROCEDURE — 1160F RVW MEDS BY RX/DR IN RCRD: CPT | Performed by: FAMILY MEDICINE

## 2024-10-15 PROCEDURE — 99214 OFFICE O/P EST MOD 30 MIN: CPT | Performed by: FAMILY MEDICINE

## 2024-10-15 RX ORDER — SULFAMETHOXAZOLE/TRIMETHOPRIM 800-160 MG
1 TABLET ORAL 2 TIMES DAILY
Qty: 20 TABLET | Refills: 0 | Status: SHIPPED | OUTPATIENT
Start: 2024-10-15

## 2024-10-15 NOTE — PROGRESS NOTES
Follow Up Office Visit      Date of Visit:  10/15/2024   Patient Name: Kandy Stallings  : 1960   MRN: [id number]     Chief Complaint:    Chief Complaint   Patient presents with    Skin Problem     Boils underneath of both breast.  X2 week right and 7 days with left breast    Abdominal Pain       History of Present Illness: Kandy Stallings is a 64 y.o. female who is here today for follow up.    History of Present Illness  The patient presents for evaluation of diabetes.    He has been managing his diabetes with a low carbohydrate diet, avoiding foods such as bread, pasta, sweets, and potato chips. He has also been taking Jardiance for his condition.    Supplemental Information  His knee is fine after the treatment in 2024. He had 3 injections.  Boils under breasts been there for a week    Subjective      Review of Systems:   Review of Systems    Past Medical History:   Past Medical History:   Diagnosis Date    Bell's palsy 2006    Chondromalacia 2023    arthoscopy    Environmental allergies     completed allergy shots    Tear of meniscus of knee     left knee  from playing basketball in high school    Tonsillitis        Past Surgical History:   Past Surgical History:   Procedure Laterality Date    CHOLECYSTECTOMY      KNEE SURGERY      left x2    TONSILLECTOMY         Family History: History reviewed. No pertinent family history.    Social History:   Social History     Socioeconomic History    Marital status: Single   Tobacco Use    Smoking status: Never     Passive exposure: Never    Smokeless tobacco: Never   Vaping Use    Vaping status: Never Used   Substance and Sexual Activity    Alcohol use: Never    Drug use: Never    Sexual activity: Never       Medications:     Current Outpatient Medications:     bumetanide (BUMEX) 1 MG tablet, Take 1 tablet by mouth Daily., Disp: 30 tablet, Rfl: 1    empagliflozin (Jardiance) 25 MG tablet tablet, Take 1 tablet by mouth Daily., Disp: 90 tablet, Rfl:  "1    fluticasone (FLONASE) 50 MCG/ACT nasal spray, 1 spray into the nostril(s) as directed by provider Every Night., Disp: 16 g, Rfl: 5    losartan (COZAAR) 100 MG tablet, Take 1 tablet by mouth Daily., Disp: 90 tablet, Rfl: 3    meclizine (ANTIVERT) 25 MG tablet, Take 1 tablet by mouth 3 (Three) Times a Day As Needed for Dizziness., Disp: 60 tablet, Rfl: 1    omeprazole (priLOSEC) 40 MG capsule, Take 1 capsule by mouth Daily., Disp: 90 capsule, Rfl: 3    potassium chloride (KLOR-CON M10) 10 MEQ CR tablet, Take 1 tablet by mouth Daily., Disp: 30 tablet, Rfl: 1    promethazine (PHENERGAN) 25 MG tablet, , Disp: , Rfl:     vitamin D (ERGOCALCIFEROL) 1.25 MG (31352 UT) capsule capsule, Take 1 capsule by mouth 2 (Two) Times a Week. Take 1 capsule twice a week, Disp: 26 capsule, Rfl: 1    sulfamethoxazole-trimethoprim (Bactrim DS) 800-160 MG per tablet, Take 1 tablet by mouth 2 (Two) Times a Day., Disp: 20 tablet, Rfl: 0    Allergies:   Allergies   Allergen Reactions    Cephalosporins Provider Review Needed    Codeine Provider Review Needed    Hydrocodone Provider Review Needed    Penicillin G Pot [Penicillin G] Provider Review Needed    Ranitidine Provider Review Needed       Objective     Physical Exam:  Vital Signs:   Vitals:    10/15/24 1313   BP: 126/78   Pulse: 71   SpO2: 97%   Weight: 114 kg (252 lb)   Height: 166.4 cm (65.5\")   PainSc:   5   PainLoc: Breast     Body mass index is 41.3 kg/m².     Physical Exam  Constitutional:       General: She is not in acute distress.     Appearance: Normal appearance. She is obese. She is not toxic-appearing or diaphoretic.   HENT:      Head: Normocephalic and atraumatic.      Right Ear: External ear normal.      Left Ear: External ear normal.      Nose: Nose normal.   Eyes:      Pupils: Pupils are equal, round, and reactive to light.   Skin:     General: Skin is warm and dry.      Findings: Erythema present.      Comments: 1 inch diam boil, pretyt tender.   Neurological:      " Mental Status: She is alert.   Psychiatric:         Mood and Affect: Mood normal.         Behavior: Behavior normal.       Physical Exam      Results  Laboratory Studies  A1c is 6.8.    Procedures      Assessment / Plan      Assessment/Plan:   Diagnoses and all orders for this visit:    1. Boils (Primary)    2. Type 2 diabetes mellitus with hyperglycemia, without long-term current use of insulin     Hgb A1c = 6.6%  3. Primary hypertension    Other orders  -     sulfamethoxazole-trimethoprim (Bactrim DS) 800-160 MG per tablet; Take 1 tablet by mouth 2 (Two) Times a Day.  Dispense: 20 tablet; Refill: 0       Assessment & Plan  1. Diabetes Mellitus.  His A1c levels have been consistently high, with readings of 6.6, 6.7, and 6.8, indicating a diabetic condition. He was advised to avoid carbohydrates, including pasta and bread, and to maintain a healthy diet. Regular physical activity, such as walking, was also recommended. Septra DS was prescribed to be taken twice a day for 10 days with food. If his A1c levels remain high, medications like Nicolitazone or Metformin may be considered.        Follow Up:   Return in about 3 months (around 1/15/2025).    Patient or patient representative verbalized consent for the use of Ambient Listening during the visit with  Suman Arroyo MD for chart documentation. 10/15/2024  14:09 EDT     @[facility] [place]

## 2024-10-30 ENCOUNTER — OFFICE VISIT (OUTPATIENT)
Dept: FAMILY MEDICINE CLINIC | Facility: CLINIC | Age: 64
End: 2024-10-30
Payer: MEDICARE

## 2024-10-30 VITALS
SYSTOLIC BLOOD PRESSURE: 122 MMHG | HEIGHT: 66 IN | HEART RATE: 60 BPM | WEIGHT: 250 LBS | OXYGEN SATURATION: 98 % | DIASTOLIC BLOOD PRESSURE: 78 MMHG | BODY MASS INDEX: 40.18 KG/M2

## 2024-10-30 DIAGNOSIS — I10 PRIMARY HYPERTENSION: ICD-10-CM

## 2024-10-30 DIAGNOSIS — R30.9 PAIN PASSING URINE: Primary | ICD-10-CM

## 2024-10-30 DIAGNOSIS — K21.00 GASTROESOPHAGEAL REFLUX DISEASE WITH ESOPHAGITIS WITHOUT HEMORRHAGE: ICD-10-CM

## 2024-10-30 DIAGNOSIS — E11.65 TYPE 2 DIABETES MELLITUS WITH HYPERGLYCEMIA, WITHOUT LONG-TERM CURRENT USE OF INSULIN: ICD-10-CM

## 2024-10-30 LAB
BILIRUB BLD-MCNC: NEGATIVE MG/DL
CLARITY, POC: CLEAR
COLOR UR: YELLOW
EXPIRATION DATE: ABNORMAL
EXPIRATION DATE: ABNORMAL
GLUCOSE UR STRIP-MCNC: NEGATIVE MG/DL
KETONES UR QL: NEGATIVE
LEUKOCYTE EST, POC: NEGATIVE
Lab: ABNORMAL
Lab: ABNORMAL
NITRITE UR-MCNC: NEGATIVE MG/ML
PH UR: 6 [PH] (ref 5–8)
POC CREATININE URINE: 300
POC MICROALBUMIN URINE: 80
PROT UR STRIP-MCNC: ABNORMAL MG/DL
RBC # UR STRIP: NEGATIVE /UL
SP GR UR: 1.02 (ref 1–1.03)
UROBILINOGEN UR QL: NORMAL

## 2024-10-30 PROCEDURE — 81003 URINALYSIS AUTO W/O SCOPE: CPT | Performed by: FAMILY MEDICINE

## 2024-10-30 PROCEDURE — 3074F SYST BP LT 130 MM HG: CPT | Performed by: FAMILY MEDICINE

## 2024-10-30 PROCEDURE — 1125F AMNT PAIN NOTED PAIN PRSNT: CPT | Performed by: FAMILY MEDICINE

## 2024-10-30 PROCEDURE — 3044F HG A1C LEVEL LT 7.0%: CPT | Performed by: FAMILY MEDICINE

## 2024-10-30 PROCEDURE — 99214 OFFICE O/P EST MOD 30 MIN: CPT | Performed by: FAMILY MEDICINE

## 2024-10-30 PROCEDURE — 1160F RVW MEDS BY RX/DR IN RCRD: CPT | Performed by: FAMILY MEDICINE

## 2024-10-30 PROCEDURE — 82044 UR ALBUMIN SEMIQUANTITATIVE: CPT | Performed by: FAMILY MEDICINE

## 2024-10-30 PROCEDURE — 3078F DIAST BP <80 MM HG: CPT | Performed by: FAMILY MEDICINE

## 2024-10-30 PROCEDURE — 1159F MED LIST DOCD IN RCRD: CPT | Performed by: FAMILY MEDICINE

## 2024-10-30 RX ORDER — LOSARTAN POTASSIUM 100 MG/1
100 TABLET ORAL DAILY
Qty: 90 TABLET | Refills: 3 | Status: SHIPPED | OUTPATIENT
Start: 2024-10-30

## 2024-10-30 RX ORDER — OMEPRAZOLE 40 MG/1
40 CAPSULE, DELAYED RELEASE ORAL 2 TIMES DAILY
Qty: 180 CAPSULE | Refills: 3 | Status: SHIPPED | OUTPATIENT
Start: 2024-10-30

## 2024-10-30 RX ORDER — FLUTICASONE PROPIONATE 50 MCG
1 SPRAY, SUSPENSION (ML) NASAL NIGHTLY
Qty: 16 G | Refills: 5 | Status: SHIPPED | OUTPATIENT
Start: 2024-10-30

## 2024-10-30 NOTE — PROGRESS NOTES
Office Note     Name: Kandy Stallings    : 1960     MRN: [id number]     Chief Complaint  boils (Breast boil follow up) and Urinary Tract Infection (Pain urination today.)    Subjective     History of Present Illness:  Kandy Stallings is a 64 y.o. female who presents today for boils on breast.  And dysuria today.  The breasts were not sore and feels greatly relieved by them.  Not having fevers chills or sweats    Review of Systems:   Review of Systems    Past Medical History:   Past Medical History:   Diagnosis Date    Bell's palsy 2006    Chondromalacia 2023    arthoscopy    Environmental allergies     completed allergy shots    Tear of meniscus of knee     left knee  from playing basketball in high school    Tonsillitis        Past Surgical History:   Past Surgical History:   Procedure Laterality Date    CHOLECYSTECTOMY      KNEE SURGERY      left x2    TONSILLECTOMY         Family History: History reviewed. No pertinent family history.    Social History:   Social History     Socioeconomic History    Marital status: Single   Tobacco Use    Smoking status: Never     Passive exposure: Never    Smokeless tobacco: Never   Vaping Use    Vaping status: Never Used   Substance and Sexual Activity    Alcohol use: Never    Drug use: Never    Sexual activity: Never       Immunizations:   Immunization History   Administered Date(s) Administered    COVID-19 (MODERNA) 12YRS+ (SPIKEVAX) 2023    COVID-19 (MODERNA) 1st,2nd,3rd Dose Monovalent 2021, 2021, 10/28/2021, 2022    COVID-19 (MODERNA) BIVALENT 12+YRS 2022    Flu Vaccine Split Quad 2019, 2020, 10/28/2021, 2023    Flublok 18+yrs 2019, 2020, 10/28/2021, 2023, 2023    Pneumococcal Conjugate 13-Valent (PCV13) 2018    Pneumococcal Polysaccharide (PPSV23) 2016    Tdap 2018        Medications:     Current Outpatient Medications:     bumetanide (BUMEX) 1 MG tablet, Take 1  "tablet by mouth Daily., Disp: 30 tablet, Rfl: 1    empagliflozin (Jardiance) 25 MG tablet tablet, Take 1 tablet by mouth Daily., Disp: 90 tablet, Rfl: 1    fluticasone (FLONASE) 50 MCG/ACT nasal spray, Administer 1 spray into the nostril(s) as directed by provider Every Night., Disp: 16 g, Rfl: 5    losartan (COZAAR) 100 MG tablet, Take 1 tablet by mouth Daily., Disp: 90 tablet, Rfl: 3    meclizine (ANTIVERT) 25 MG tablet, Take 1 tablet by mouth 3 (Three) Times a Day As Needed for Dizziness., Disp: 60 tablet, Rfl: 1    omeprazole (priLOSEC) 40 MG capsule, Take 1 capsule by mouth 2 (Two) Times a Day., Disp: 180 capsule, Rfl: 3    potassium chloride (KLOR-CON M10) 10 MEQ CR tablet, Take 1 tablet by mouth Daily., Disp: 30 tablet, Rfl: 1    promethazine (PHENERGAN) 25 MG tablet, , Disp: , Rfl:     vitamin D (ERGOCALCIFEROL) 1.25 MG (48911 UT) capsule capsule, Take 1 capsule by mouth 2 (Two) Times a Week. Take 1 capsule twice a week, Disp: 26 capsule, Rfl: 1    Allergies:   Allergies   Allergen Reactions    Cephalosporins Provider Review Needed    Codeine Provider Review Needed    Hydrocodone Provider Review Needed    Penicillin G Pot [Penicillin G] Provider Review Needed    Ranitidine Provider Review Needed       Objective     Vital Signs  /78   Pulse 60   Ht 166.4 cm (65.5\")   Wt 113 kg (250 lb)   SpO2 98%   BMI 40.97 kg/m²   Estimated body mass index is 40.97 kg/m² as calculated from the following:    Height as of this encounter: 166.4 cm (65.5\").    Weight as of this encounter: 113 kg (250 lb).            Physical Exam  Constitutional:       General: She is not in acute distress.     Appearance: Normal appearance. She is obese. She is not toxic-appearing or diaphoretic.   HENT:      Head: Normocephalic and atraumatic.      Right Ear: External ear normal.      Left Ear: External ear normal.      Nose: Nose normal.   Eyes:      Pupils: Pupils are equal, round, and reactive to light.   Chest:          " Comments: Two tiny purplish , nontender.   Skin:     General: Skin is warm and dry.   Neurological:      Mental Status: She is alert.   Psychiatric:         Mood and Affect: Mood normal.         Behavior: Behavior normal.          Procedures     Assessment and Plan     1. Pain passing urine  UA was okay  - POC Urinalysis Dipstick, Automated    2. Type 2 diabetes mellitus with hyperglycemia, without long-term current use of insulin  Refilled her Jardiance  - POC Microalbumin    3. Primary hypertension  Controlled  - losartan (COZAAR) 100 MG tablet; Take 1 tablet by mouth Daily.  Dispense: 90 tablet; Refill: 3    4. Gastroesophageal reflux disease with esophagitis without hemorrhage  She she reminds me of the they open up her pharynx, send on twice a day omeprazole  - omeprazole (priLOSEC) 40 MG capsule; Take 1 capsule by mouth 2 (Two) Times a Day.  Dispense: 180 capsule; Refill: 3       Follow Up  Return if symptoms worsen or fail to improve.    @me   [facility]  [address] [place] [id number] [phone]

## 2024-11-26 ENCOUNTER — OFFICE VISIT (OUTPATIENT)
Dept: FAMILY MEDICINE CLINIC | Facility: CLINIC | Age: 64
End: 2024-11-26
Payer: MEDICARE

## 2024-11-26 VITALS
SYSTOLIC BLOOD PRESSURE: 130 MMHG | DIASTOLIC BLOOD PRESSURE: 82 MMHG | WEIGHT: 249 LBS | HEART RATE: 55 BPM | HEIGHT: 66 IN | BODY MASS INDEX: 40.02 KG/M2 | OXYGEN SATURATION: 99 %

## 2024-11-26 DIAGNOSIS — I87.2 VENOUS STASIS DERMATITIS: Primary | ICD-10-CM

## 2024-11-26 DIAGNOSIS — E03.9 HYPOTHYROIDISM, UNSPECIFIED TYPE: ICD-10-CM

## 2024-11-26 DIAGNOSIS — E11.65 TYPE 2 DIABETES MELLITUS WITH HYPERGLYCEMIA, WITHOUT LONG-TERM CURRENT USE OF INSULIN: ICD-10-CM

## 2024-11-26 DIAGNOSIS — R53.83 OTHER FATIGUE: ICD-10-CM

## 2024-11-26 DIAGNOSIS — E55.9 VITAMIN D DEFICIENCY: ICD-10-CM

## 2024-11-26 DIAGNOSIS — D53.1 MEGALOBLASTIC ANEMIA: ICD-10-CM

## 2024-11-26 DIAGNOSIS — I10 PRIMARY HYPERTENSION: ICD-10-CM

## 2024-11-26 DIAGNOSIS — E78.2 MIXED HYPERLIPIDEMIA: ICD-10-CM

## 2024-11-26 PROCEDURE — 1159F MED LIST DOCD IN RCRD: CPT | Performed by: FAMILY MEDICINE

## 2024-11-26 PROCEDURE — 3075F SYST BP GE 130 - 139MM HG: CPT | Performed by: FAMILY MEDICINE

## 2024-11-26 PROCEDURE — 3044F HG A1C LEVEL LT 7.0%: CPT | Performed by: FAMILY MEDICINE

## 2024-11-26 PROCEDURE — 1125F AMNT PAIN NOTED PAIN PRSNT: CPT | Performed by: FAMILY MEDICINE

## 2024-11-26 PROCEDURE — 3079F DIAST BP 80-89 MM HG: CPT | Performed by: FAMILY MEDICINE

## 2024-11-26 PROCEDURE — 99214 OFFICE O/P EST MOD 30 MIN: CPT | Performed by: FAMILY MEDICINE

## 2024-11-26 PROCEDURE — 1160F RVW MEDS BY RX/DR IN RCRD: CPT | Performed by: FAMILY MEDICINE

## 2024-11-26 RX ORDER — POTASSIUM CHLORIDE 750 MG/1
10 TABLET, EXTENDED RELEASE ORAL DAILY
Qty: 30 TABLET | Refills: 1 | Status: SHIPPED | OUTPATIENT
Start: 2024-11-26

## 2024-11-26 RX ORDER — BUMETANIDE 1 MG/1
1 TABLET ORAL DAILY
Qty: 30 TABLET | Refills: 1 | Status: SHIPPED | OUTPATIENT
Start: 2024-11-26

## 2024-11-26 RX ORDER — TRIAMCINOLONE ACETONIDE 1 MG/G
1 CREAM TOPICAL 2 TIMES DAILY
Qty: 80 G | Refills: 1 | Status: SHIPPED | OUTPATIENT
Start: 2024-11-26

## 2024-11-26 NOTE — PROGRESS NOTES
Follow Up Office Visit      Date of Visit:  2024   Patient Name: Kandy Stallings  : 1960   MRN: [id number]     Chief Complaint:    Chief Complaint   Patient presents with    Rash     On both legs for couple weeks       History of Present Illness: Kandy Stallings is a 64 y.o. female who is here today for follow up.    History of Present Illness  The patient presents for evaluation of multiple medical concerns.    She has been experiencing a rash for some time, which has recently worsened. The rash is accompanied by swelling and itching, particularly when her dog scratches her.    She has run out of her diuretic medication and is requesting a refill. She also takes potassium supplements.    She has abstained from soft drinks for the past 2 weeks, suspecting they may be causing her stomach discomfort. She is currently taking vitamin D twice a week and does not take vitamin B12.    She reports a previous episode of breathing problems. She was playing at [employer] and when she got up to sit down, the center of her back popped. When she got home, her back was hurting and it has not hurt for 3 weeks. She does not know whether something popped or maybe it was a disc or something that touched the nerve. She has not had any pain since then.      Subjective      Review of Systems:   Review of Systems    Past Medical History:   Past Medical History:   Diagnosis Date    Bell's palsy 2006    Chondromalacia 2023    arthoscopy    Environmental allergies     completed allergy shots    Tear of meniscus of knee     left knee  from playing basketball in high school    Tonsillitis        Past Surgical History:   Past Surgical History:   Procedure Laterality Date    CHOLECYSTECTOMY      KNEE SURGERY      left x2    TONSILLECTOMY         Family History: History reviewed. No pertinent family history.    Social History:   Social History     Socioeconomic History    Marital status: Single   Tobacco Use    Smoking  "status: Never     Passive exposure: Never    Smokeless tobacco: Never   Vaping Use    Vaping status: Never Used   Substance and Sexual Activity    Alcohol use: Never    Drug use: Never    Sexual activity: Never       Medications:     Current Outpatient Medications:     bumetanide (BUMEX) 1 MG tablet, Take 1 tablet by mouth Daily., Disp: 30 tablet, Rfl: 1    empagliflozin (Jardiance) 25 MG tablet tablet, Take 1 tablet by mouth Daily., Disp: 90 tablet, Rfl: 1    fluticasone (FLONASE) 50 MCG/ACT nasal spray, Administer 1 spray into the nostril(s) as directed by provider Every Night., Disp: 16 g, Rfl: 5    losartan (COZAAR) 100 MG tablet, Take 1 tablet by mouth Daily., Disp: 90 tablet, Rfl: 3    meclizine (ANTIVERT) 25 MG tablet, Take 1 tablet by mouth 3 (Three) Times a Day As Needed for Dizziness., Disp: 60 tablet, Rfl: 1    omeprazole (priLOSEC) 40 MG capsule, Take 1 capsule by mouth 2 (Two) Times a Day., Disp: 180 capsule, Rfl: 3    potassium chloride (KLOR-CON M10) 10 MEQ CR tablet, Take 1 tablet by mouth Daily., Disp: 30 tablet, Rfl: 1    promethazine (PHENERGAN) 25 MG tablet, , Disp: , Rfl:     vitamin D (ERGOCALCIFEROL) 1.25 MG (30885 UT) capsule capsule, Take 1 capsule by mouth 2 (Two) Times a Week. Take 1 capsule twice a week, Disp: 26 capsule, Rfl: 1    triamcinolone (KENALOG) 0.1 % cream, Apply 1 Application topically to the appropriate area as directed 2 (Two) Times a Day., Disp: 80 g, Rfl: 1    Allergies:   Allergies   Allergen Reactions    Cephalosporins Provider Review Needed    Codeine Provider Review Needed    Hydrocodone Provider Review Needed    Penicillin G Pot [Penicillin G] Provider Review Needed    Ranitidine Provider Review Needed       Objective     Physical Exam:  Vital Signs:   Vitals:    11/26/24 0828   BP: 130/82   Pulse: 55   SpO2: 99%   Weight: 113 kg (249 lb)   Height: 166.4 cm (65.5\")     Body mass index is 40.81 kg/m².     Physical Exam  Constitutional:       General: She is not in " acute distress.     Appearance: Normal appearance. She is obese. She is not toxic-appearing or diaphoretic.   HENT:      Head: Normocephalic and atraumatic.      Right Ear: External ear normal.      Left Ear: External ear normal.      Nose: Nose normal.   Eyes:      Pupils: Pupils are equal, round, and reactive to light.   Skin:     General: Skin is warm and dry.             Comments: Consistent with venous stasis dermatitis 2+ edema   Neurological:      Mental Status: She is alert.   Psychiatric:         Mood and Affect: Mood normal.         Behavior: Behavior normal.       Physical Exam      Results      Procedures      Assessment / Plan      Assessment/Plan:   Diagnoses and all orders for this visit:    1. Venous stasis dermatitis (Primary)    2. Vitamin D deficiency  -     Vitamin D,25-Hydroxy    3. Type 2 diabetes mellitus with hyperglycemia, without long-term current use of insulin  -     Hemoglobin A1c    4. Mixed hyperlipidemia  -     Lipid Panel    5. Primary hypertension  -     Comprehensive Metabolic Panel    6. Hypothyroidism, unspecified type  -     TSH  -     T4, Free    7. Other fatigue  -     CBC & Differential    8. Megaloblastic anemia  -     Vitamin B12 & Folate    Other orders  -     bumetanide (BUMEX) 1 MG tablet; Take 1 tablet by mouth Daily.  Dispense: 30 tablet; Refill: 1  -     potassium chloride (KLOR-CON M10) 10 MEQ CR tablet; Take 1 tablet by mouth Daily.  Dispense: 30 tablet; Refill: 1  -     triamcinolone (KENALOG) 0.1 % cream; Apply 1 Application topically to the appropriate area as directed 2 (Two) Times a Day.  Dispense: 80 g; Refill: 1       Assessment & Plan  1. Venous Stasis Dermatitis.  Triamcinolone cream was prescribed to alleviate the swelling and itching. He is advised to apply a pea-sized amount of the cream to the affected area. A refill of Bumex 1 mg and potassium was provided. He is instructed to take the water pill and potassium pill together.     2. Vitamin D  Deficiency.  He is currently taking vitamin D twice a week. Blood work, including CMP and vitamin D levels, was ordered to monitor his levels.    3. Type 2 Diabetes.  Blood work, including A1c, was ordered to monitor his diabetes. He is advised to continue his current medication regimen.        Follow Up:   Return in about 6 months (around 5/26/2025).    Patient or patient representative verbalized consent for the use of Ambient Listening during the visit with  Suman Arroyo MD for chart documentation. 11/26/2024  09:07 EST     @[facility] [place]

## 2024-11-27 LAB
25(OH)D3+25(OH)D2 SERPL-MCNC: 14.3 NG/ML (ref 30–100)
ALBUMIN SERPL-MCNC: 3.9 G/DL (ref 3.9–4.9)
ALP SERPL-CCNC: 81 IU/L (ref 44–121)
ALT SERPL-CCNC: 30 IU/L (ref 0–32)
AST SERPL-CCNC: 23 IU/L (ref 0–40)
BASOPHILS # BLD AUTO: 0 X10E3/UL (ref 0–0.2)
BASOPHILS NFR BLD AUTO: 0 %
BILIRUB SERPL-MCNC: 0.5 MG/DL (ref 0–1.2)
BUN SERPL-MCNC: 22 MG/DL (ref 8–27)
BUN/CREAT SERPL: 18 (ref 12–28)
CALCIUM SERPL-MCNC: 9 MG/DL (ref 8.7–10.3)
CHLORIDE SERPL-SCNC: 103 MMOL/L (ref 96–106)
CHOLEST SERPL-MCNC: 205 MG/DL (ref 100–199)
CO2 SERPL-SCNC: 25 MMOL/L (ref 20–29)
CREAT SERPL-MCNC: 1.19 MG/DL (ref 0.57–1)
EGFRCR SERPLBLD CKD-EPI 2021: 50 ML/MIN/1.73
EOSINOPHIL # BLD AUTO: 0.3 X10E3/UL (ref 0–0.4)
EOSINOPHIL NFR BLD AUTO: 4 %
ERYTHROCYTE [DISTWIDTH] IN BLOOD BY AUTOMATED COUNT: 12.8 % (ref 11.7–15.4)
FOLATE SERPL-MCNC: 2.3 NG/ML
GLOBULIN SER CALC-MCNC: 2.4 G/DL (ref 1.5–4.5)
GLUCOSE SERPL-MCNC: 133 MG/DL (ref 70–99)
HBA1C MFR BLD: 6.7 % (ref 4.8–5.6)
HCT VFR BLD AUTO: 45.5 % (ref 34–46.6)
HDLC SERPL-MCNC: 72 MG/DL
HGB BLD-MCNC: 14.7 G/DL (ref 11.1–15.9)
IMM GRANULOCYTES # BLD AUTO: 0 X10E3/UL (ref 0–0.1)
IMM GRANULOCYTES NFR BLD AUTO: 0 %
LDLC SERPL CALC-MCNC: 119 MG/DL (ref 0–99)
LYMPHOCYTES # BLD AUTO: 2 X10E3/UL (ref 0.7–3.1)
LYMPHOCYTES NFR BLD AUTO: 25 %
MCH RBC QN AUTO: 31.3 PG (ref 26.6–33)
MCHC RBC AUTO-ENTMCNC: 32.3 G/DL (ref 31.5–35.7)
MCV RBC AUTO: 97 FL (ref 79–97)
MONOCYTES # BLD AUTO: 0.5 X10E3/UL (ref 0.1–0.9)
MONOCYTES NFR BLD AUTO: 6 %
NEUTROPHILS # BLD AUTO: 5.2 X10E3/UL (ref 1.4–7)
NEUTROPHILS NFR BLD AUTO: 65 %
PLATELET # BLD AUTO: 285 X10E3/UL (ref 150–450)
POTASSIUM SERPL-SCNC: 4.2 MMOL/L (ref 3.5–5.2)
PROT SERPL-MCNC: 6.3 G/DL (ref 6–8.5)
RBC # BLD AUTO: 4.7 X10E6/UL (ref 3.77–5.28)
SODIUM SERPL-SCNC: 141 MMOL/L (ref 134–144)
T4 FREE SERPL-MCNC: 0.99 NG/DL (ref 0.82–1.77)
TRIGL SERPL-MCNC: 80 MG/DL (ref 0–149)
TSH SERPL DL<=0.005 MIU/L-ACNC: 2.32 UIU/ML (ref 0.45–4.5)
VIT B12 SERPL-MCNC: 322 PG/ML (ref 232–1245)
VLDLC SERPL CALC-MCNC: 14 MG/DL (ref 5–40)
WBC # BLD AUTO: 8.1 X10E3/UL (ref 3.4–10.8)

## 2024-12-02 ENCOUNTER — TELEPHONE (OUTPATIENT)
Dept: FAMILY MEDICINE CLINIC | Facility: CLINIC | Age: 64
End: 2024-12-02
Payer: MEDICARE

## 2024-12-02 RX ORDER — FOLIC ACID 1 MG/1
1 TABLET ORAL DAILY
Qty: 90 TABLET | Refills: 3 | Status: SHIPPED | OUTPATIENT
Start: 2024-12-02

## 2024-12-02 NOTE — TELEPHONE ENCOUNTER
----- Message from Suman Arroyo sent at 12/2/2024  7:58 AM EST -----  Kandy, all your blood work was normal except folate 2.3 (greater than 3.0) B12 where I do not like it, 322.  Take folic acid 1 mg alcohol again for you and take B12 1000 mg twice a week.  Hemoglobin equals 6.7% which means you are averaging 140 lipid profile about the same, vitamin D is superlow, when you are you going to take the 50,000 units twice a week?

## 2025-01-17 ENCOUNTER — OFFICE VISIT (OUTPATIENT)
Dept: FAMILY MEDICINE CLINIC | Facility: CLINIC | Age: 65
End: 2025-01-17
Payer: MEDICARE

## 2025-01-17 VITALS
BODY MASS INDEX: 40.18 KG/M2 | WEIGHT: 250 LBS | HEART RATE: 60 BPM | OXYGEN SATURATION: 98 % | HEIGHT: 66 IN | SYSTOLIC BLOOD PRESSURE: 132 MMHG | DIASTOLIC BLOOD PRESSURE: 80 MMHG

## 2025-01-17 DIAGNOSIS — S90.852A FOREIGN BODY IN LEFT FOOT, INITIAL ENCOUNTER: Primary | ICD-10-CM

## 2025-01-17 DIAGNOSIS — E55.9 VITAMIN D DEFICIENCY: ICD-10-CM

## 2025-01-17 RX ORDER — ERGOCALCIFEROL 1.25 MG/1
50000 CAPSULE, LIQUID FILLED ORAL 2 TIMES WEEKLY
Qty: 26 CAPSULE | Refills: 1 | Status: SHIPPED | OUTPATIENT
Start: 2025-01-20

## 2025-01-17 NOTE — PROGRESS NOTES
Follow Up Office Visit      Date of Visit:  2025   Patient Name: Kandy Stallings  : 1960   MRN: [id number]     Chief Complaint:    Chief Complaint   Patient presents with    Foot Injury     Piece of glass in left foot  Side of heel.   X2 weeks       History of Present Illness: Kandy Stallings is a 64 y.o. female who is here today for follow up.    History of Present Illness  The patient presents for evaluation of a foreign body in her foot.    She reports persistent pain in her foot, which she attributes to a foreign body lodged within. The pain is not constant but occurs intermittently, particularly when walking. She recalls an incident where she extracted a small sliver from her foot after stepping on it, suspecting it to be a piece of wood. Despite her efforts to remove the foreign body, she believes a fragment remains embedded in her foot. She expresses concern about potential infection, given that the foreign body has been present for approximately 2 weeks.    Supplemental Information  She has blisters on both breasts and one on her back underneath the shoulder. They are coming more lightly than they were before because they were all just on the stomach and now they are coming everywhere.      Subjective      Review of Systems:   Review of Systems    Past Medical History:   Past Medical History:   Diagnosis Date    Bell's palsy 2006    Chondromalacia 2023    arthoscopy    Environmental allergies     completed allergy shots    Tear of meniscus of knee     left knee  from playing basketball in high school    Tonsillitis        Past Surgical History:   Past Surgical History:   Procedure Laterality Date    CHOLECYSTECTOMY      KNEE SURGERY      left x2    TONSILLECTOMY         Family History: History reviewed. No pertinent family history.    Social History:   Social History     Socioeconomic History    Marital status: Single   Tobacco Use    Smoking status: Never     Passive exposure:  "Never    Smokeless tobacco: Never   Vaping Use    Vaping status: Never Used   Substance and Sexual Activity    Alcohol use: Never    Drug use: Never    Sexual activity: Never       Medications:     Current Outpatient Medications:     bumetanide (BUMEX) 1 MG tablet, Take 1 tablet by mouth Daily., Disp: 30 tablet, Rfl: 1    empagliflozin (Jardiance) 25 MG tablet tablet, Take 1 tablet by mouth Daily., Disp: 90 tablet, Rfl: 1    fluticasone (FLONASE) 50 MCG/ACT nasal spray, Administer 1 spray into the nostril(s) as directed by provider Every Night., Disp: 16 g, Rfl: 5    folic acid (FOLVITE) 1 MG tablet, Take 1 tablet by mouth Daily., Disp: 90 tablet, Rfl: 3    losartan (COZAAR) 100 MG tablet, Take 1 tablet by mouth Daily., Disp: 90 tablet, Rfl: 3    meclizine (ANTIVERT) 25 MG tablet, Take 1 tablet by mouth 3 (Three) Times a Day As Needed for Dizziness., Disp: 60 tablet, Rfl: 1    omeprazole (priLOSEC) 40 MG capsule, Take 1 capsule by mouth 2 (Two) Times a Day., Disp: 180 capsule, Rfl: 3    potassium chloride (KLOR-CON M10) 10 MEQ CR tablet, Take 1 tablet by mouth Daily., Disp: 30 tablet, Rfl: 1    promethazine (PHENERGAN) 25 MG tablet, , Disp: , Rfl:     triamcinolone (KENALOG) 0.1 % cream, Apply 1 Application topically to the appropriate area as directed 2 (Two) Times a Day., Disp: 80 g, Rfl: 1    [START ON 1/20/2025] vitamin D (ERGOCALCIFEROL) 1.25 MG (07098 UT) capsule capsule, Take 1 capsule by mouth 2 (Two) Times a Week. Take 1 capsule twice a week, Disp: 26 capsule, Rfl: 1    Allergies:   Allergies   Allergen Reactions    Cephalosporins Provider Review Needed    Codeine Provider Review Needed    Hydrocodone Provider Review Needed    Penicillin G Pot [Penicillin G] Provider Review Needed    Ranitidine Provider Review Needed       Objective     Physical Exam:  Vital Signs:   Vitals:    01/17/25 1113   BP: 132/80   Pulse: 60   SpO2: 98%   Weight: 113 kg (250 lb)   Height: 166.4 cm (65.5\")   PainLoc: Foot  Comment: " left     Body mass index is 40.97 kg/m².     Physical Exam  Constitutional:       General: She is not in acute distress.     Appearance: Normal appearance. She is obese. She is not toxic-appearing or diaphoretic.   HENT:      Head: Normocephalic and atraumatic.      Right Ear: External ear normal.      Left Ear: External ear normal.      Nose: Nose normal.   Eyes:      Pupils: Pupils are equal, round, and reactive to light.   Musculoskeletal:        Feet:    Feet:      Comments: Foreign body left foot.  Skin:     General: Skin is warm and dry.   Neurological:      Mental Status: She is alert.   Psychiatric:         Mood and Affect: Mood normal.         Behavior: Behavior normal.       Physical Exam      Results      Foreign Body Removal    Date/Time: 1/17/2025 11:56 AM    Performed by: Suman Arroyo MD  Authorized by: Suman Arroyo MD  Body area: skin  General location: lower extremity  Location details: left foot    Sedation:  Patient sedated: no    Patient restrained: no  Patient cooperative: yes  Localization method: visualized  Removal mechanism: scalpel  Dressing: dressing applied  Tendon involvement: none  Depth: subcutaneous  Complexity: simple  1 objects recovered.  Objects recovered: 1 tiny sliver  Post-procedure assessment: foreign body removed  Patient tolerance: patient tolerated the procedure well with no immediate complications          Assessment / Plan      Assessment/Plan:   Diagnoses and all orders for this visit:    1. Foreign body in left foot, initial encounter (Primary)    2. Vitamin D deficiency  -     vitamin D (ERGOCALCIFEROL) 1.25 MG (01266 UT) capsule capsule; Take 1 capsule by mouth 2 (Two) Times a Week. Take 1 capsule twice a week  Dispense: 26 capsule; Refill: 1       Assessment & Plan  1. Foreign body in foot.  A small piece of glass was successfully extracted from the affected area. The wound does not exhibit signs of infection. She has been advised to apply a pad to the wound  site for protection. If the condition worsens, a referral to a podiatrist will be considered.if it gets better, so be it, if it gets worse--see to podiatrist.    PROCEDURE  A small piece of glass was successfully extracted from the affected area of the foot.    Follow Up:   Return if symptoms worsen or fail to improve.    Patient or patient representative verbalized consent for the use of Ambient Listening during the visit with  Suman Arroyo MD for chart documentation. 1/17/2025  11:59 EST     @[facility] [place]

## 2025-05-12 ENCOUNTER — OFFICE VISIT (OUTPATIENT)
Dept: FAMILY MEDICINE CLINIC | Facility: CLINIC | Age: 65
End: 2025-05-12
Payer: MEDICARE

## 2025-05-12 VITALS
HEIGHT: 66 IN | HEART RATE: 60 BPM | DIASTOLIC BLOOD PRESSURE: 80 MMHG | SYSTOLIC BLOOD PRESSURE: 140 MMHG | BODY MASS INDEX: 40.18 KG/M2 | OXYGEN SATURATION: 96 % | WEIGHT: 250 LBS

## 2025-05-12 DIAGNOSIS — E11.65 TYPE 2 DIABETES MELLITUS WITH HYPERGLYCEMIA, WITHOUT LONG-TERM CURRENT USE OF INSULIN: ICD-10-CM

## 2025-05-12 DIAGNOSIS — R25.2 CRAMP IN LOWER LEG: ICD-10-CM

## 2025-05-12 DIAGNOSIS — I10 PRIMARY HYPERTENSION: ICD-10-CM

## 2025-05-12 DIAGNOSIS — D53.1 MEGALOBLASTIC ANEMIA: ICD-10-CM

## 2025-05-12 DIAGNOSIS — E55.9 VITAMIN D DEFICIENCY: Primary | ICD-10-CM

## 2025-05-12 DIAGNOSIS — R53.83 OTHER FATIGUE: ICD-10-CM

## 2025-05-12 PROCEDURE — 1159F MED LIST DOCD IN RCRD: CPT | Performed by: FAMILY MEDICINE

## 2025-05-12 PROCEDURE — 99214 OFFICE O/P EST MOD 30 MIN: CPT | Performed by: FAMILY MEDICINE

## 2025-05-12 PROCEDURE — 3077F SYST BP >= 140 MM HG: CPT | Performed by: FAMILY MEDICINE

## 2025-05-12 PROCEDURE — 3079F DIAST BP 80-89 MM HG: CPT | Performed by: FAMILY MEDICINE

## 2025-05-12 PROCEDURE — 1126F AMNT PAIN NOTED NONE PRSNT: CPT | Performed by: FAMILY MEDICINE

## 2025-05-12 PROCEDURE — 1160F RVW MEDS BY RX/DR IN RCRD: CPT | Performed by: FAMILY MEDICINE

## 2025-05-12 RX ORDER — BUMETANIDE 1 MG/1
1 TABLET ORAL DAILY
Qty: 30 TABLET | Refills: 1 | Status: SHIPPED | OUTPATIENT
Start: 2025-05-12

## 2025-05-12 RX ORDER — POTASSIUM CHLORIDE 750 MG/1
10 TABLET, EXTENDED RELEASE ORAL DAILY
Qty: 30 TABLET | Refills: 1 | Status: SHIPPED | OUTPATIENT
Start: 2025-05-12

## 2025-05-12 NOTE — PROGRESS NOTES
Follow Up Office Visit      Date of Visit:  2025   Patient Name: Kandy Stallings  : 1960   MRN: [id number]     Chief Complaint:    Chief Complaint   Patient presents with    Leg Swelling     Med recheck    Diabetes       History of Present Illness: Kandy Stallings is a 65 y.o. female who is here today for follow up.    History of Present Illness  The patient is a 65-year-old female who presents for evaluation of foot swelling, diabetes, and medication management.    She reports an improvement in her overall health status. However, she has been experiencing persistent swelling in her foot, which she describes as being more pronounced on one side than the other. The affected area appears blistered, but she is uncertain if this is due to dry skin or fluid accumulation. She also reports itching in the area. She has been applying triamcinolone cream twice daily, which has resulted in some improvement. Despite this, the swelling remains more severe in one foot compared to the other. She also notes that the posterior aspect of her leg is involved and experiences pain. She suspects that the swelling may be due to fluid retention. She has not been experiencing significant shortness of breath.    She has been diagnosed with prediabetes and is currently on Jardiance, for which she requires a refill. She has not consumed any food today and typically does not eat until around 3:00 PM. She reports minimal weight loss.    She is currently taking folic acid supplements and Synthroid. She is also on losartan, which is effective until 2025. She is taking omeprazole 40 mg twice daily. She is on bumetanide and potassium chloride.    She has been experiencing stomach discomfort after meals, which necessitates lying down and sleeping. This occurs after every meal, prompting her to experiment with different foods to identify potential triggers. She is on omeprazole 40 mg twice daily, but it has not provided  relief.    She has been using Flonase, which has been effective in managing her symptoms. She reports no recent issues with her sinuses.      Subjective      Review of Systems:   Review of Systems    Past Medical History:   Past Medical History:   Diagnosis Date    Bell's palsy 2006    Chondromalacia 11/06/2023    arthoscopy    Environmental allergies     completed allergy shots    Tear of meniscus of knee     left knee  from playing basketball in high school    Tonsillitis        Past Surgical History:   Past Surgical History:   Procedure Laterality Date    CHOLECYSTECTOMY      KNEE SURGERY      left x2    TONSILLECTOMY         Family History: History reviewed. No pertinent family history.    Social History:   Social History     Socioeconomic History    Marital status: Single   Tobacco Use    Smoking status: Never     Passive exposure: Never    Smokeless tobacco: Never   Vaping Use    Vaping status: Never Used   Substance and Sexual Activity    Alcohol use: Never    Drug use: Never    Sexual activity: Never       Medications:     Current Outpatient Medications:     bumetanide (BUMEX) 1 MG tablet, Take 1 tablet by mouth Daily., Disp: 30 tablet, Rfl: 1    empagliflozin (Jardiance) 25 MG tablet tablet, Take 1 tablet by mouth Daily., Disp: 90 tablet, Rfl: 1    fluticasone (FLONASE) 50 MCG/ACT nasal spray, Administer 1 spray into the nostril(s) as directed by provider Every Night., Disp: 16 g, Rfl: 5    folic acid (FOLVITE) 1 MG tablet, Take 1 tablet by mouth Daily., Disp: 90 tablet, Rfl: 3    losartan (COZAAR) 100 MG tablet, Take 1 tablet by mouth Daily., Disp: 90 tablet, Rfl: 3    meclizine (ANTIVERT) 25 MG tablet, Take 1 tablet by mouth 3 (Three) Times a Day As Needed for Dizziness., Disp: 60 tablet, Rfl: 1    omeprazole (priLOSEC) 40 MG capsule, Take 1 capsule by mouth 2 (Two) Times a Day., Disp: 180 capsule, Rfl: 3    potassium chloride (KLOR-CON M10) 10 MEQ CR tablet, Take 1 tablet by mouth Daily., Disp: 30  "tablet, Rfl: 1    promethazine (PHENERGAN) 25 MG tablet, , Disp: , Rfl:     triamcinolone (KENALOG) 0.1 % cream, Apply 1 Application topically to the appropriate area as directed 2 (Two) Times a Day., Disp: 80 g, Rfl: 1    vitamin D (ERGOCALCIFEROL) 1.25 MG (23544 UT) capsule capsule, Take 1 capsule by mouth 2 (Two) Times a Week. Take 1 capsule twice a week, Disp: 26 capsule, Rfl: 1    Allergies:   Allergies   Allergen Reactions    Cephalosporins Provider Review Needed    Codeine Provider Review Needed    Hydrocodone Provider Review Needed    Penicillin G Pot [Penicillin G] Provider Review Needed    Ranitidine Provider Review Needed       Objective     Physical Exam:  Vital Signs:   Vitals:    05/12/25 0917   BP: 140/80   Pulse: 60   SpO2: 96%   Weight: 113 kg (250 lb)   Height: 166.4 cm (65.5\")   PainSc: 0-No pain     Body mass index is 40.97 kg/m².     Physical Exam  Constitutional:       General: She is not in acute distress.     Appearance: Normal appearance. She is obese. She is not toxic-appearing or diaphoretic.   HENT:      Head: Normocephalic and atraumatic.      Right Ear: External ear normal.      Left Ear: External ear normal.      Nose: Nose normal.   Eyes:      Pupils: Pupils are equal, round, and reactive to light.   Skin:     General: Skin is warm and dry.             Comments: Left foot worse than right foot distal tibia edema 2+with 4mm rising 2 mm, papules between.    Neurological:      Mental Status: She is alert.   Psychiatric:         Mood and Affect: Mood normal.         Behavior: Behavior normal.       Physical Exam  Ears: Left ear appears scarred, otherwise normal.  Extremities: Fluid noted in the posterior part of the leg.  Skin: Blistery areas noted on the foot, with itching.    Results  Labs   - Hemoglobin A1c: 6.7    Procedures      Assessment / Plan      Assessment/Plan:   Diagnoses and all orders for this visit:    1. Vitamin D deficiency (Primary)  -     Vitamin D,25-Hydroxy    2. " Primary hypertension  -     Comprehensive Metabolic Panel    3. Type 2 diabetes mellitus with hyperglycemia, without long-term current use of insulin  -     Cancel: POC Albumin/Creatinine Ratio Urine  -     Hemoglobin A1c    4. Megaloblastic anemia  -     Vitamin B12 & Folate    5. Other fatigue    6. Cramp in lower leg  -     Magnesium    Other orders  -     potassium chloride (KLOR-CON M10) 10 MEQ CR tablet; Take 1 tablet by mouth Daily.  Dispense: 30 tablet; Refill: 1  -     bumetanide (BUMEX) 1 MG tablet; Take 1 tablet by mouth Daily.  Dispense: 30 tablet; Refill: 1  -     empagliflozin (Jardiance) 25 MG tablet tablet; Take 1 tablet by mouth Daily.  Dispense: 90 tablet; Refill: 1       Assessment & Plan  1. Foot swelling.  - Reports swelling in the foot, possibly due to fluid retention.  - Physical exam findings include fluid accumulation and itching.  - Advised to continue using triamcinolone cream twice daily for itching.  - Bumetanide (Bumex) will be continued once daily, and a prescription refill has been provided. Potassium chloride will also be continued.    2. Diabetes mellitus.  - Hemoglobin A1c level is 6.7, indicating diabetes.  - Advised to be cautious with diet during the trip, especially avoiding fried foods.  - Blood test will be conducted to reassess hemoglobin A1c levels.  - Prescription refill for Jardiance has been provided.    3. Medication management.  - Currently taking folic acid daily and vit d 2 x a week.  - Losartan is effective until October 2025.  - Taking omeprazole 40 mg twice daily.    Follow Up:   Return in about 6 months (around 11/12/2025).    Patient or patient representative verbalized consent for the use of Ambient Listening during the visit with  Suman Arroyo MD for chart documentation. 5/12/2025  10:18 EDT     @[facility] [place]

## 2025-05-13 LAB
25(OH)D3+25(OH)D2 SERPL-MCNC: 12.1 NG/ML (ref 30–100)
ALBUMIN SERPL-MCNC: 3.9 G/DL (ref 3.9–4.9)
ALP SERPL-CCNC: 83 IU/L (ref 44–121)
ALT SERPL-CCNC: 25 IU/L (ref 0–32)
AST SERPL-CCNC: 19 IU/L (ref 0–40)
BILIRUB SERPL-MCNC: 0.5 MG/DL (ref 0–1.2)
BUN SERPL-MCNC: 17 MG/DL (ref 8–27)
BUN/CREAT SERPL: 16 (ref 12–28)
CALCIUM SERPL-MCNC: 9 MG/DL (ref 8.7–10.3)
CHLORIDE SERPL-SCNC: 101 MMOL/L (ref 96–106)
CO2 SERPL-SCNC: 23 MMOL/L (ref 20–29)
CREAT SERPL-MCNC: 1.06 MG/DL (ref 0.57–1)
EGFRCR SERPLBLD CKD-EPI 2021: 58 ML/MIN/1.73
FOLATE SERPL-MCNC: 3.3 NG/ML
GLOBULIN SER CALC-MCNC: 2.3 G/DL (ref 1.5–4.5)
GLUCOSE SERPL-MCNC: 121 MG/DL (ref 70–99)
HBA1C MFR BLD: 7.1 % (ref 4.8–5.6)
MAGNESIUM SERPL-MCNC: 2 MG/DL (ref 1.6–2.3)
POTASSIUM SERPL-SCNC: 3.8 MMOL/L (ref 3.5–5.2)
PROT SERPL-MCNC: 6.2 G/DL (ref 6–8.5)
SODIUM SERPL-SCNC: 140 MMOL/L (ref 134–144)
VIT B12 SERPL-MCNC: 306 PG/ML (ref 232–1245)

## 2025-06-10 ENCOUNTER — OFFICE VISIT (OUTPATIENT)
Dept: FAMILY MEDICINE CLINIC | Facility: CLINIC | Age: 65
End: 2025-06-10
Payer: MEDICARE

## 2025-06-10 VITALS
HEART RATE: 70 BPM | WEIGHT: 247 LBS | BODY MASS INDEX: 39.7 KG/M2 | SYSTOLIC BLOOD PRESSURE: 140 MMHG | OXYGEN SATURATION: 96 % | DIASTOLIC BLOOD PRESSURE: 78 MMHG | HEIGHT: 66 IN

## 2025-06-10 DIAGNOSIS — E11.65 TYPE 2 DIABETES MELLITUS WITH HYPERGLYCEMIA, WITHOUT LONG-TERM CURRENT USE OF INSULIN: ICD-10-CM

## 2025-06-10 DIAGNOSIS — N61.0 CELLULITIS OF RIGHT BREAST: Primary | ICD-10-CM

## 2025-06-10 PROCEDURE — 1126F AMNT PAIN NOTED NONE PRSNT: CPT | Performed by: FAMILY MEDICINE

## 2025-06-10 PROCEDURE — 1160F RVW MEDS BY RX/DR IN RCRD: CPT | Performed by: FAMILY MEDICINE

## 2025-06-10 PROCEDURE — 3078F DIAST BP <80 MM HG: CPT | Performed by: FAMILY MEDICINE

## 2025-06-10 PROCEDURE — 3051F HG A1C>EQUAL 7.0%<8.0%: CPT | Performed by: FAMILY MEDICINE

## 2025-06-10 PROCEDURE — 1159F MED LIST DOCD IN RCRD: CPT | Performed by: FAMILY MEDICINE

## 2025-06-10 PROCEDURE — 99214 OFFICE O/P EST MOD 30 MIN: CPT | Performed by: FAMILY MEDICINE

## 2025-06-10 PROCEDURE — 3077F SYST BP >= 140 MM HG: CPT | Performed by: FAMILY MEDICINE

## 2025-06-10 RX ORDER — DOXYCYCLINE 100 MG/1
100 CAPSULE ORAL 2 TIMES DAILY
Qty: 20 CAPSULE | Refills: 0 | Status: SHIPPED | OUTPATIENT
Start: 2025-06-10

## 2025-06-10 NOTE — PROGRESS NOTES
Follow Up Office Visit      Date of Visit:  06/10/2025   Patient Name: Kandy Stallings  : 1960   MRN: [id number]     Chief Complaint:    Chief Complaint   Patient presents with    Abscess     On right breast       History of Present Illness: Kandy Stallings is a 65 y.o. female who is here today for follow up.    History of Present Illness  The patient presents for evaluation of ear pain.    She reports experiencing intermittent ear pain, which is severe enough to disrupt her sleep. The pain is localized to the same area each time it recurs. She also notes some drainage from the ear. She has not been monitoring her blood sugar levels and is uncertain about her A1c status. She reports no symptoms of fever or chills. She has a known allergy to CEPHALOSPORINS and PENICILLIN.      Subjective      Review of Systems:   Review of Systems    Past Medical History:   Past Medical History:   Diagnosis Date    Bell's palsy 2006    Chondromalacia 2023    arthoscopy    Environmental allergies     completed allergy shots    Tear of meniscus of knee     left knee  from playing basketball in high school    Tonsillitis        Past Surgical History:   Past Surgical History:   Procedure Laterality Date    CHOLECYSTECTOMY      KNEE SURGERY      left x2    TONSILLECTOMY         Family History: History reviewed. No pertinent family history.    Social History:   Social History     Socioeconomic History    Marital status: Single   Tobacco Use    Smoking status: Never     Passive exposure: Never    Smokeless tobacco: Never   Vaping Use    Vaping status: Never Used   Substance and Sexual Activity    Alcohol use: Never    Drug use: Never    Sexual activity: Never       Medications:     Current Outpatient Medications:     bumetanide (BUMEX) 1 MG tablet, Take 1 tablet by mouth Daily., Disp: 30 tablet, Rfl: 1    empagliflozin (Jardiance) 25 MG tablet tablet, Take 1 tablet by mouth Daily., Disp: 90 tablet, Rfl: 1    fluticasone  Agree with your advice.   What happened about the blood tests I wanted her to get 2 days ago?   LVM advised pt of msg from Dr. Bills, requested call back re: labs.   Pt calls back she states that she has not done her labs d/t recent knee surgery 1/13/22.  I advised since she has a HHRN that she can draw labs for her in her home.  She will advise RN @ next visit.   RN Triage:  Pt calls recent R Knee surgery she had developed a blister on her R-foot, feels it may be r/t GERMAN hose rubbing.  Pt instructed to call surgeon and advise for instructions.  Pt states she already has a call out to surgeon she is awaiting a call back.    Pt advised not to pop blister elevate her leg await further instruction from her surgeon.    Request faxed for RN lab draw at next visit.    "(FLONASE) 50 MCG/ACT nasal spray, Administer 1 spray into the nostril(s) as directed by provider Every Night., Disp: 16 g, Rfl: 5    folic acid (FOLVITE) 1 MG tablet, Take 1 tablet by mouth Daily., Disp: 90 tablet, Rfl: 3    losartan (COZAAR) 100 MG tablet, Take 1 tablet by mouth Daily., Disp: 90 tablet, Rfl: 3    meclizine (ANTIVERT) 25 MG tablet, Take 1 tablet by mouth 3 (Three) Times a Day As Needed for Dizziness., Disp: 60 tablet, Rfl: 1    omeprazole (priLOSEC) 40 MG capsule, Take 1 capsule by mouth 2 (Two) Times a Day., Disp: 180 capsule, Rfl: 3    potassium chloride (KLOR-CON M10) 10 MEQ CR tablet, Take 1 tablet by mouth Daily., Disp: 30 tablet, Rfl: 1    triamcinolone (KENALOG) 0.1 % cream, Apply 1 Application topically to the appropriate area as directed 2 (Two) Times a Day., Disp: 80 g, Rfl: 1    vitamin D (ERGOCALCIFEROL) 1.25 MG (14285 UT) capsule capsule, Take 1 capsule by mouth 2 (Two) Times a Week. Take 1 capsule twice a week, Disp: 26 capsule, Rfl: 1    doxycycline (VIBRAMYCIN) 100 MG capsule, Take 1 capsule by mouth 2 (Two) Times a Day., Disp: 20 capsule, Rfl: 0    promethazine (PHENERGAN) 25 MG tablet, , Disp: , Rfl:     Allergies:   Allergies   Allergen Reactions    Cephalosporins Provider Review Needed    Codeine Provider Review Needed    Hydrocodone Provider Review Needed    Penicillin G Pot [Penicillin G] Provider Review Needed    Ranitidine Provider Review Needed       Objective     Physical Exam:  Vital Signs:   Vitals:    06/10/25 1432   BP: 140/78   Pulse: 70   SpO2: 96%   Weight: 112 kg (247 lb)   Height: 166.4 cm (65.5\")     Body mass index is 40.48 kg/m².     Physical Exam  Physical Exam  Skin: Redness noted around the affected area, intermittent drainage observed    Results      Procedures      Assessment / Plan      Assessment/Plan:   Diagnoses and all orders for this visit:    1. Cellulitis of right breast (Primary)    2. Type 2 diabetes mellitus with hyperglycemia, without long-term " current use of insulin---last  A1c - 7.1 %last month    Other orders  -     doxycycline (VIBRAMYCIN) 100 MG capsule; Take 1 capsule by mouth 2 (Two) Times a Day.  Dispense: 20 capsule; Refill: 0       Assessment & Plan  1. Ear pain.  - Persistent ear pain with redness around the area, causing sleep disturbances.  - Presence of drainage from the ear, without fever or chills.  - Discussion about starting antibiotics to address the infection.  - Doxycycline 100 mg prescribed, to be taken twice daily. If no improvement within 2 days, patient should notify the clinic.    Follow Up:   No follow-ups on file.    Patient or patient representative verbalized consent for the use of Ambient Listening during the visit with  Suman Arroyo MD for chart documentation. 6/10/2025  15:32 EDT     @[facility] [place]    no

## 2025-08-06 ENCOUNTER — OFFICE VISIT (OUTPATIENT)
Dept: FAMILY MEDICINE CLINIC | Facility: CLINIC | Age: 65
End: 2025-08-06
Payer: MEDICARE

## 2025-08-06 VITALS
WEIGHT: 247 LBS | DIASTOLIC BLOOD PRESSURE: 80 MMHG | HEIGHT: 66 IN | BODY MASS INDEX: 39.7 KG/M2 | HEART RATE: 62 BPM | OXYGEN SATURATION: 91 % | SYSTOLIC BLOOD PRESSURE: 160 MMHG

## 2025-08-06 DIAGNOSIS — S90.32XA CONTUSION OF LEFT FOOT, INITIAL ENCOUNTER: ICD-10-CM

## 2025-08-06 DIAGNOSIS — S99.922A INJURY OF LEFT FOOT, INITIAL ENCOUNTER: Primary | ICD-10-CM
